# Patient Record
Sex: MALE | Race: BLACK OR AFRICAN AMERICAN | NOT HISPANIC OR LATINO | Employment: UNEMPLOYED | URBAN - METROPOLITAN AREA
[De-identification: names, ages, dates, MRNs, and addresses within clinical notes are randomized per-mention and may not be internally consistent; named-entity substitution may affect disease eponyms.]

---

## 2018-11-19 ENCOUNTER — HOSPITAL ENCOUNTER (EMERGENCY)
Facility: HOSPITAL | Age: 43
Discharge: HOME | End: 2018-11-19
Attending: EMERGENCY MEDICINE

## 2018-11-19 VITALS
WEIGHT: 206.56 LBS | TEMPERATURE: 99 F | HEART RATE: 88 BPM | BODY MASS INDEX: 29.57 KG/M2 | DIASTOLIC BLOOD PRESSURE: 69 MMHG | HEIGHT: 70 IN | SYSTOLIC BLOOD PRESSURE: 134 MMHG | OXYGEN SATURATION: 98 % | RESPIRATION RATE: 14 BRPM

## 2018-11-19 DIAGNOSIS — T78.40XA ALLERGIC REACTION, INITIAL ENCOUNTER: Primary | ICD-10-CM

## 2018-11-19 DIAGNOSIS — B35.4 TINEA CORPORIS: ICD-10-CM

## 2018-11-19 PROCEDURE — 99284 EMERGENCY DEPT VISIT MOD MDM: CPT

## 2018-11-19 PROCEDURE — 25000003 PHARM REV CODE 250: Performed by: PHYSICIAN ASSISTANT

## 2018-11-19 PROCEDURE — 63600175 PHARM REV CODE 636 W HCPCS: Performed by: PHYSICIAN ASSISTANT

## 2018-11-19 RX ORDER — CLOTRIMAZOLE 1 %
CREAM (GRAM) TOPICAL
Qty: 45 G | Refills: 0 | Status: SHIPPED | OUTPATIENT
Start: 2018-11-19 | End: 2024-03-31

## 2018-11-19 RX ORDER — PREDNISONE 20 MG/1
40 TABLET ORAL
Status: COMPLETED | OUTPATIENT
Start: 2018-11-19 | End: 2018-11-19

## 2018-11-19 RX ORDER — DIPHENHYDRAMINE HCL 50 MG
50 CAPSULE ORAL
Status: COMPLETED | OUTPATIENT
Start: 2018-11-19 | End: 2018-11-19

## 2018-11-19 RX ORDER — PREDNISONE 20 MG/1
40 TABLET ORAL DAILY
Qty: 10 TABLET | Refills: 0 | Status: SHIPPED | OUTPATIENT
Start: 2018-11-19 | End: 2018-11-24

## 2018-11-19 RX ADMIN — DIPHENHYDRAMINE HYDROCHLORIDE 50 MG: 50 CAPSULE ORAL at 12:11

## 2018-11-19 RX ADMIN — PREDNISONE 40 MG: 20 TABLET ORAL at 12:11

## 2018-11-19 NOTE — DISCHARGE INSTRUCTIONS
You can take zyrtec daily to help instead of benadryl.  Take steroids as prescribed  Use the topical cream for the small areas of fungal rash. Try and change your clothes if you are sweating and stay dry.  Follow up with your primary care provider.  For worsening symptoms, chest pain, shortness of breath, increased abdominal pain, high grade fever, stroke or stroke like symptoms, immediately go to the nearest Emergency Room or call 911 as soon as possible.

## 2018-11-19 NOTE — ED PROVIDER NOTES
Encounter Date: 11/19/2018    SCRIBE #1 NOTE: I, Jairo Gellerlucian, am scribing for, and in the presence of, Deanne Wong PA-C.       History     Chief Complaint   Patient presents with    Rash     rash L torso with itching x 2 days.     Time seen by provider: 12:32 PM on 11/19/2018      Alex Nobles is a 43 y.o. male with no medical history who presents to the ED for further evaluation of a rash that started 3 days ago. The patient states that he noticed the rash on the upper back 3 days ago, that started to spread to his back, abdomen, and scrotal region. Per patient, he relays that the rash is red and raised. He admits that he has some swelling to his lips and scrotum region too. The patient states that he is in a hot environment and sweats a decent amount. Per patient has had no new environmental or hygenic agents. He also denies SOB, trouble swallowing, chest pain, abdominal pain, headache, and fever. He states he has NKDA.      The history is provided by the patient.     Review of patient's allergies indicates:  No Known Allergies  History reviewed. No pertinent past medical history.  History reviewed. No pertinent surgical history.  History reviewed. No pertinent family history.  Social History     Tobacco Use    Smoking status: Never Smoker   Substance Use Topics    Alcohol use: Not on file    Drug use: Yes     Types: Marijuana     Review of Systems   Constitutional: Negative for activity change, appetite change, chills and fever.   HENT: Negative for congestion, rhinorrhea and sore throat.    Eyes: Negative for redness and visual disturbance.   Respiratory: Negative for cough, chest tightness and shortness of breath.    Cardiovascular: Negative for chest pain.   Gastrointestinal: Negative for abdominal pain, diarrhea, nausea and vomiting.   Genitourinary: Negative for dysuria and frequency.   Musculoskeletal: Negative for back pain, neck pain and neck stiffness.   Skin: Positive for rash.    Neurological: Negative for dizziness, syncope, numbness and headaches.       Physical Exam     Initial Vitals [11/19/18 1219]   BP Pulse Resp Temp SpO2   134/69 88 14 98.7 °F (37.1 °C) 98 %      MAP       --         Physical Exam    Nursing note and vitals reviewed.  Constitutional: He appears well-developed and well-nourished. He is cooperative.  Non-toxic appearance. He does not have a sickly appearance.   HENT:   Head: Normocephalic and atraumatic.   Right Ear: External ear normal.   Left Ear: External ear normal.   Nose: Nose normal.   Mouth/Throat: Oropharynx is clear and moist.   Eyes: Conjunctivae and lids are normal. Pupils are equal, round, and reactive to light.   Neck: Normal range of motion and full passive range of motion without pain. Neck supple. No stridor present.   Cardiovascular: Normal rate, regular rhythm and normal heart sounds. Exam reveals no gallop and no friction rub.    No murmur heard.  Pulmonary/Chest: Breath sounds normal. He has no wheezes. He has no rhonchi. He has no rales.   Abdominal: Normal appearance.   Neurological: He is alert and oriented to person, place, and time.   Skin: Skin is warm, dry and intact. Rash noted.   Erythematous macular papular rash noted to the back, scrotum, and stomach.   A few scaly patches noted to the back that are consistent with a fungal rash.         ED Course   Procedures  Labs Reviewed - No data to display       Imaging Results    None          Medical Decision Making:   History:   Old Medical Records: I decided to obtain old medical records.       APC / Resident Notes:   Urgent evaluation of a 43-year-old male who presents with rash for 2 days.  He reports associated pruritus.  He is well appearing.  Vital signs are stable. No oral swelling. No stridor. Rash consistent with urticaria. He also has an unassociated fungal rash likely secondary to staying damp/sweating at work. Will give steroids and benadryl for symptoms and topical cream for  fungal rash. Follow up with PCP and dermatology. Discussed results with patient. Return precautions given. Based on my clinical evaluation, I do not appreciate any immediate, emergent, or life threatening condition or etiology that warrants additional workup today and feel that the patient can be discharged with close follow up care.  Patient is to follow up with their primary care provider. Case was discussed with Dr. Bianchi who has evaluated the patient and is in agreement with the plan of care. All questions answered.          Scribe Attestation:   Scribe #1: I performed the above scribed service and the documentation accurately describes the services I performed. I attest to the accuracy of the note.    Attending Attestation:     Physician Attestation Statement for NP/PA:   I have conducted a face to face encounter with this patient in addition to the NP/PA, due to Medical Complexity    Other NP/PA Attestation Additions:      Medical Decision Making: I provided a face to face evaluation of this patient.  I discussed the patient's care with Advanced Practice Clinician.  I reviewed their note and agree with the history, physical, assessment, diagnosis, treatment, and discharge plan provided by the Advanced Practice Clinician. My overall impression is tinea corporis, urticaria.  The patient has been instructed to follow up with their physician or the one provided as well as specific return precautions.            I, Deanne Wong PA-C, personally performed the services described in this documentation. All medical record entries made by the scribe were at my direction and in my presence.  I have reviewed the chart and agree that the record reflects my personal performance and is accurate and complete. Deanne Wong PA-C.  4:16 PM 11/19/2018          ED Course as of Nov 19 1621   Mon Nov 19, 2018   1242 BP: 134/69 [EF]   1242 Temp: 98.7 °F (37.1 °C) [EF]   1242 Temp src: Oral [EF]   1242 Pulse: 88 [EF]    1242 Resp: 14 [EF]   1242 SpO2: 98 % [EF]   1254 Patient presents to the emergency room with diffuse scaly rash.  Multiple oval circular lesions, scaly.  Clinically consistent with a tinea infection.  Patient also appears to have secondary heights.  Patient has an urticarial rash that is located in other regions as well.  He has some swelling to the dorsum of the penis.  Patient will be given oral steroids and Benadryl in the ER.  He reports improvement earlier today with oral Benadryl.  Patient appears to have tinea with a concomitant urticarial reaction.  Patient will be given topical antifungal and oral steroids and referred to local primary care.  [EF]      ED Course User Index  [EF] David Bianchi MD     Clinical Impression:   The primary encounter diagnosis was Allergic reaction, initial encounter. A diagnosis of Tinea corporis was also pertinent to this visit.      Disposition:   Disposition: Discharged  Condition: Stable                        Deanne Wong PA-C  11/19/18 1625       David Bianchi MD  11/19/18 7450

## 2018-12-24 ENCOUNTER — HOSPITAL ENCOUNTER (EMERGENCY)
Facility: HOSPITAL | Age: 43
Discharge: HOME | End: 2018-12-24
Attending: EMERGENCY MEDICINE

## 2018-12-24 VITALS
HEART RATE: 64 BPM | TEMPERATURE: 98 F | OXYGEN SATURATION: 98 % | HEIGHT: 70 IN | SYSTOLIC BLOOD PRESSURE: 134 MMHG | BODY MASS INDEX: 31.35 KG/M2 | DIASTOLIC BLOOD PRESSURE: 60 MMHG | WEIGHT: 219 LBS | RESPIRATION RATE: 18 BRPM

## 2018-12-24 DIAGNOSIS — R22.0 SWELLING OF UPPER LIP: Primary | ICD-10-CM

## 2018-12-24 PROCEDURE — 63600175 PHARM REV CODE 636 W HCPCS: Performed by: EMERGENCY MEDICINE

## 2018-12-24 PROCEDURE — 25000003 PHARM REV CODE 250: Performed by: EMERGENCY MEDICINE

## 2018-12-24 PROCEDURE — 96372 THER/PROPH/DIAG INJ SC/IM: CPT | Mod: 59

## 2018-12-24 PROCEDURE — 99284 EMERGENCY DEPT VISIT MOD MDM: CPT | Mod: 25

## 2018-12-24 RX ORDER — METHYLPREDNISOLONE SOD SUCC 125 MG
125 VIAL (EA) INJECTION
Status: COMPLETED | OUTPATIENT
Start: 2018-12-24 | End: 2018-12-24

## 2018-12-24 RX ORDER — DIPHENHYDRAMINE HYDROCHLORIDE 50 MG/ML
25 INJECTION INTRAMUSCULAR; INTRAVENOUS
Status: COMPLETED | OUTPATIENT
Start: 2018-12-24 | End: 2018-12-24

## 2018-12-24 RX ORDER — FAMOTIDINE 20 MG/1
20 TABLET, FILM COATED ORAL
Status: COMPLETED | OUTPATIENT
Start: 2018-12-24 | End: 2018-12-24

## 2018-12-24 RX ADMIN — FAMOTIDINE 20 MG: 20 TABLET ORAL at 05:12

## 2018-12-24 RX ADMIN — DIPHENHYDRAMINE HYDROCHLORIDE 25 MG: 50 INJECTION, SOLUTION INTRAMUSCULAR; INTRAVENOUS at 05:12

## 2018-12-24 RX ADMIN — METHYLPREDNISOLONE SODIUM SUCCINATE 125 MG: 125 INJECTION, POWDER, FOR SOLUTION INTRAMUSCULAR; INTRAVENOUS at 05:12

## 2018-12-24 NOTE — ED TRIAGE NOTES
Patient here with allergic reaction to unknown substance; upper lip swollen and tingling and had hives a month ago

## 2018-12-24 NOTE — ED NOTES
Patient identifiers for Alex Nobles checked and correct.  LOC:  Patient is awake, alert, and aware of environment with an appropriate affect. Patient is oriented x 3 and speaking appropriately.  APPEARANCE:  Patient resting comfortably and in no acute distress. Patient is clean and well groomed, patient's clothing is properly fastened.  SKIN:  The skin is warm and dry. Patient has normal skin turgor and moist mucus membranes. Skin is intact; no bruising or breakdown noted.  MUSCULOSKELETAL:  Patient is moving all extremities well, no obvious deformities noted. Pulses intact.   RESPIRATORY:  Airway is open and patent. Respirations are spontaneous and non-labored with normal effort and rate.  CARDIAC:  Patient has a normal rate and rhythm. No peripheral edema noted.   ABDOMEN:  No distention noted.    NEUROLOGICAL:   Facial expression is symmetrical.    Allergies reported:  Review of patient's allergies indicates:  No Known Allergies  OTHER NOTES:  Patient here with swollen upper lip, started suddenly and thinks it's an allergic reaction to unknown substance; here a month ago with rash.

## 2018-12-25 NOTE — ED PROVIDER NOTES
Encounter Date: 12/24/2018       History     Chief Complaint   Patient presents with    Allergic Reaction     to unknown substance; lip swollen     Patient is a 43-year-old male presenting to the emergency department complains of isolated lower lip swelling that he noticed upon awakening this morning.  He denies taking Ace inhibitors or angiotensin receptor blockers.  He denies a previous history of anaphylaxis but states he has experienced 1 generalized allergic reaction 1 month ago, stimulated by an unknown agent.  He denies taking anything for symptoms prior to arrival.  He denies swelling in the mouth, wheezing or difficulty breathing.          Review of patient's allergies indicates:  No Known Allergies  History reviewed. No pertinent past medical history.  History reviewed. No pertinent surgical history.  History reviewed. No pertinent family history.  Social History     Tobacco Use    Smoking status: Never Smoker    Smokeless tobacco: Never Used   Substance Use Topics    Alcohol use: Not on file    Drug use: Yes     Types: Marijuana     Review of Systems   HENT: Positive for facial swelling.    Respiratory: Negative for wheezing.    Skin: Negative for rash.   Neurological: Negative for syncope.       Physical Exam     Initial Vitals [12/24/18 0515]   BP Pulse Resp Temp SpO2   134/60 64 18 98.3 °F (36.8 °C) 98 %      MAP       --         Physical Exam    Nursing note and vitals reviewed.  Constitutional: He appears well-nourished. No distress.   HENT:   Head: Normocephalic and atraumatic.   Isolated lower lip swelling without intraoral swelling, no facial swelling, no stridor   Eyes: Conjunctivae and EOM are normal.   Pulmonary/Chest: No respiratory distress. He has no wheezes.   Musculoskeletal: He exhibits no edema.   Skin: No rash noted.   Psychiatric: He has a normal mood and affect. Thought content normal.         ED Course   Procedures  Labs Reviewed - No data to display       Imaging Results     None          Medical Decision Making:   ED Management:  Patient assessed emergently.  No evidence of airway compromise.  Patient received improvement with intramuscular steroids and Benadryl.  Multiple etiologies were discussed but a particular stimulus cannot be identified.  The patient is strongly encouraged to follow up with an allergist/immunologist as soon as possible regarding further testing.  Current no indication for additional steroid burst or taper but the patient is asked to return to the ER for any new, concerning, or worsening symptoms, including any worsening swelling, wheezing or difficulty breathing.  Patient is agreeable with this plan for follow-up and he was discharged in stable condition.                      Clinical Impression:   The encounter diagnosis was Swelling of upper lip.      Disposition:   Disposition: Discharged  Condition: Stable                        José Miguel Almonte MD  12/25/18 0578

## 2019-03-25 ENCOUNTER — HOSPITAL ENCOUNTER (EMERGENCY)
Facility: HOSPITAL | Age: 44
Discharge: HOME | End: 2019-03-25
Attending: EMERGENCY MEDICINE
Payer: COMMERCIAL

## 2019-03-25 VITALS
DIASTOLIC BLOOD PRESSURE: 51 MMHG | WEIGHT: 202 LBS | HEART RATE: 67 BPM | BODY MASS INDEX: 29.92 KG/M2 | TEMPERATURE: 98 F | RESPIRATION RATE: 20 BRPM | OXYGEN SATURATION: 97 % | HEIGHT: 69 IN | SYSTOLIC BLOOD PRESSURE: 113 MMHG

## 2019-03-25 DIAGNOSIS — S39.012A STRAIN OF LUMBAR REGION, INITIAL ENCOUNTER: Primary | ICD-10-CM

## 2019-03-25 PROCEDURE — 99284 EMERGENCY DEPT VISIT MOD MDM: CPT | Mod: 25

## 2019-03-25 PROCEDURE — 63600175 PHARM REV CODE 636 W HCPCS: Performed by: PHYSICIAN ASSISTANT

## 2019-03-25 PROCEDURE — 96372 THER/PROPH/DIAG INJ SC/IM: CPT

## 2019-03-25 RX ORDER — NAPROXEN 500 MG/1
500 TABLET ORAL 2 TIMES DAILY WITH MEALS
Qty: 14 TABLET | Refills: 0 | Status: SHIPPED | OUTPATIENT
Start: 2019-03-25 | End: 2019-04-01

## 2019-03-25 RX ORDER — ORPHENADRINE CITRATE 30 MG/ML
60 INJECTION INTRAMUSCULAR; INTRAVENOUS
Status: COMPLETED | OUTPATIENT
Start: 2019-03-25 | End: 2019-03-25

## 2019-03-25 RX ORDER — KETOROLAC TROMETHAMINE 30 MG/ML
30 INJECTION, SOLUTION INTRAMUSCULAR; INTRAVENOUS
Status: COMPLETED | OUTPATIENT
Start: 2019-03-25 | End: 2019-03-25

## 2019-03-25 RX ORDER — CYCLOBENZAPRINE HCL 10 MG
10 TABLET ORAL 3 TIMES DAILY PRN
Qty: 21 TABLET | Refills: 0 | Status: SHIPPED | OUTPATIENT
Start: 2019-03-25 | End: 2019-04-01

## 2019-03-25 RX ADMIN — ORPHENADRINE CITRATE 60 MG: 60 INJECTION INTRAMUSCULAR; INTRAVENOUS at 12:03

## 2019-03-25 RX ADMIN — KETOROLAC TROMETHAMINE 30 MG: 30 INJECTION, SOLUTION INTRAMUSCULAR at 12:03
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
## 2024-02-14 ENCOUNTER — HOSPITAL ENCOUNTER (EMERGENCY)
Facility: HOSPITAL | Age: 49
Discharge: HOME | End: 2024-02-14
Attending: EMERGENCY MEDICINE

## 2024-02-14 VITALS
DIASTOLIC BLOOD PRESSURE: 97 MMHG | RESPIRATION RATE: 18 BRPM | HEART RATE: 99 BPM | OXYGEN SATURATION: 95 % | BODY MASS INDEX: 28.63 KG/M2 | TEMPERATURE: 99 F | HEIGHT: 70 IN | SYSTOLIC BLOOD PRESSURE: 170 MMHG | WEIGHT: 200 LBS

## 2024-02-14 DIAGNOSIS — S51.811A LACERATION OF RIGHT FOREARM, INITIAL ENCOUNTER: Primary | ICD-10-CM

## 2024-02-14 PROCEDURE — 99284 EMERGENCY DEPT VISIT MOD MDM: CPT | Mod: 25

## 2024-02-14 PROCEDURE — 90471 IMMUNIZATION ADMIN: CPT | Performed by: STUDENT IN AN ORGANIZED HEALTH CARE EDUCATION/TRAINING PROGRAM

## 2024-02-14 PROCEDURE — 90715 TDAP VACCINE 7 YRS/> IM: CPT | Performed by: STUDENT IN AN ORGANIZED HEALTH CARE EDUCATION/TRAINING PROGRAM

## 2024-02-14 PROCEDURE — 63600175 PHARM REV CODE 636 W HCPCS: Performed by: STUDENT IN AN ORGANIZED HEALTH CARE EDUCATION/TRAINING PROGRAM

## 2024-02-14 PROCEDURE — 12004 RPR S/N/AX/GEN/TRK7.6-12.5CM: CPT

## 2024-02-14 PROCEDURE — 25000003 PHARM REV CODE 250: Performed by: STUDENT IN AN ORGANIZED HEALTH CARE EDUCATION/TRAINING PROGRAM

## 2024-02-14 RX ORDER — LIDOCAINE HYDROCHLORIDE 10 MG/ML
10 INJECTION, SOLUTION EPIDURAL; INFILTRATION; INTRACAUDAL; PERINEURAL
Status: COMPLETED | OUTPATIENT
Start: 2024-02-14 | End: 2024-02-14

## 2024-02-14 RX ORDER — CEPHALEXIN 500 MG/1
500 CAPSULE ORAL 4 TIMES DAILY
Qty: 20 CAPSULE | Refills: 0 | Status: SHIPPED | OUTPATIENT
Start: 2024-02-14 | End: 2024-02-19

## 2024-02-14 RX ADMIN — CLOSTRIDIUM TETANI TOXOID ANTIGEN (FORMALDEHYDE INACTIVATED), CORYNEBACTERIUM DIPHTHERIAE TOXOID ANTIGEN (FORMALDEHYDE INACTIVATED), BORDETELLA PERTUSSIS TOXOID ANTIGEN (GLUTARALDEHYDE INACTIVATED), BORDETELLA PERTUSSIS FILAMENTOUS HEMAGGLUTININ ANTIGEN (FORMALDEHYDE INACTIVATED), BORDETELLA PERTUSSIS PERTACTIN ANTIGEN, AND BORDETELLA PERTUSSIS FIMBRIAE 2/3 ANTIGEN 0.5 ML: 5; 2; 2.5; 5; 3; 5 INJECTION, SUSPENSION INTRAMUSCULAR at 04:02

## 2024-02-14 RX ADMIN — LIDOCAINE HYDROCHLORIDE 100 MG: 10 SOLUTION INTRAVENOUS at 04:02
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
## 2024-02-14 NOTE — DISCHARGE INSTRUCTIONS
You were evaluated and treated in the emergency department today. You were found to have a diagnoses that can be managed well at home, however that requires your commitment to getting better.   Problem-Specific Instructions:  Keep wound clean and dry.  Wash 3 times daily with gentle soap and water.  Do not submerge in standing water until sutures have been removed.  Return to the emergency room for suture removal in 7-10 days.      Ensure you follow up with your Primary Care Provider or any additional providers listed on this discharge sheet. While you may be healthy enough to go home today, I cannot predict the exact course of your diagnoses. As such, it is your responsibility to monitor symptoms, follow-up with another healthcare provider, or return to the emergency room for new or worsening concerns. Unless otherwise instructed, continue all home medications and any new medications prescribed to you in the Emergency Department.   General Maintenance: Ensure adequate hydration to prevent prolonged illness and recovery. Monitor your caloric intake with a goal of obtaining and maintaining a healthy weight to help prevent the development of chronic and life-threatening medical conditions. Start healthy fitness habits and aim for a goal of 30 minutes to an hour of exercise 3-5 times a week. Avoid the use of tobacco, alcohol, and illicit drugs as these may be detrimental to your health goals.

## 2024-02-24 ENCOUNTER — HOSPITAL ENCOUNTER (EMERGENCY)
Facility: HOSPITAL | Age: 49
Discharge: HOME | End: 2024-02-24
Attending: EMERGENCY MEDICINE

## 2024-02-24 VITALS
RESPIRATION RATE: 20 BRPM | OXYGEN SATURATION: 98 % | TEMPERATURE: 98 F | DIASTOLIC BLOOD PRESSURE: 90 MMHG | SYSTOLIC BLOOD PRESSURE: 150 MMHG | WEIGHT: 205 LBS | HEART RATE: 62 BPM | BODY MASS INDEX: 29.35 KG/M2 | HEIGHT: 70 IN

## 2024-02-24 DIAGNOSIS — L08.9 WOUND INFECTION: Primary | ICD-10-CM

## 2024-02-24 DIAGNOSIS — T14.8XXA WOUND INFECTION: Primary | ICD-10-CM

## 2024-02-24 PROCEDURE — 99282 EMERGENCY DEPT VISIT SF MDM: CPT

## 2024-02-24 PROCEDURE — 87070 CULTURE OTHR SPECIMN AEROBIC: CPT | Performed by: EMERGENCY MEDICINE

## 2024-02-24 RX ORDER — DOXYCYCLINE 100 MG/1
100 CAPSULE ORAL 2 TIMES DAILY
Qty: 20 CAPSULE | Refills: 0 | Status: SHIPPED | OUTPATIENT
Start: 2024-02-24 | End: 2024-03-05

## 2024-02-24 NOTE — ED PROVIDER NOTES
Encounter Date: 2/24/2024       History     Chief Complaint   Patient presents with    Wound Infection     Pt had stitches on the 14th, and feels like it may be infected. Looks red and swollen possible abscess.      49-year-old male presents to the emergency department status post laceration repair to the right forearm 10 days ago.  Patient reports that he was grilling when he accidentally lacerated his right forearm he was seen here and evaluated had a negative x-ray and sutures were placed he was discharged home with Keflex.  Patient states he is here to have his sutures removed he also reports swelling lateral to the incision with redness denies any associated fevers or foreign body sensation.       Review of patient's allergies indicates:  No Known Allergies  No past medical history on file.  No past surgical history on file.  No family history on file.  Social History     Tobacco Use    Smoking status: Never    Smokeless tobacco: Never   Substance Use Topics    Drug use: Yes     Types: Marijuana     Review of Systems   Constitutional:  Negative for fever.   HENT: Negative.     Respiratory: Negative.     Cardiovascular: Negative.    Gastrointestinal: Negative.    Genitourinary: Negative.    Skin:  Positive for wound.        Laceration right forearm    Neurological: Negative.  Negative for weakness, light-headedness and numbness.   All other systems reviewed and are negative.      Physical Exam     Initial Vitals [02/24/24 1008]   BP Pulse Resp Temp SpO2   (!) 152/93 (!) 59 18 97.7 °F (36.5 °C) 97 %      MAP       --         Physical Exam    Vitals reviewed.  Constitutional: He appears well-developed and well-nourished.   HENT:   Head: Normocephalic and atraumatic.   Eyes: Conjunctivae and EOM are normal. Pupils are equal, round, and reactive to light.   Pulmonary/Chest: Breath sounds normal.     Neurological: He is alert and oriented to person, place, and time. He has normal strength.   Skin: Skin is warm. No  rash noted.   Patient has a healed incision with 12 sutures intact there is some mild erythema and swelling with fluctuance to the lateral aspect of the wound patient denies any distal numbness or paresthesias he is able to flex and extend his wrist with passive resistance pulses are intact   Psychiatric: He has a normal mood and affect.         ED Course   Suture Removal    Date/Time: 2/24/2024 11:13 AM  Location procedure was performed: [facility] EMERGENCY DEPARTMENT    Performed by: Rosanne Ashley FNP  Authorized by: Roel Oneal Jr., MD  Body area: upper extremity  Wound Appearance: erythematous, tender, no drainage and clean  Sutures Removed: 12  Post-removal: Steri-Strips applied  Comments: 21 gauge needle used to aspirate fluctuant area next to suture line no purulent drainage noted just a small scant of blood.  There was no swelling noted after needle removed        Labs Reviewed   CULTURE, AEROBIC  (SPECIFY SOURCE)          Imaging Results    None          Medications - No data to display  Medical Decision Making  49-year-old male presents to the emergency department status post laceration repair to the right forearm 10 days ago.  Patient reports that he was grilling when he accidentally lacerated his right forearm he was seen here and evaluated had a negative x-ray and sutures were placed he was discharged home with Keflex.  Patient states he is here to have his sutures removed he also reports swelling lateral to the incision with redness denies any associated fevers or foreign body sensation.     Considerations include suture removal, infected wound, infected seroma, abscess, cellulitis    49-year-old male presents emergency department status post laceration repair 10 days ago after accidentally lacerating right forearm with a knife while growing.  Patient is here to have sutures removed the incision site is clean with no erythema or drainage.  However lateral to the incision there is erythema, and  swelling.  Sutures were removed without any wound dehiscence or drainage noted a sterile needle was placed in the area of fluctuance a small amount of blood drained no purulent drainage noted the blood was placed on a culture swab and sent to the lab for wound culturing.  Wound is suspicious for infected seroma therefore patient will be placed on doxycycline.  Patient denies any distal numbness or tingling, paresthesias there is no decreased range of motion with passive resistance to the wrist patient will be referred to hand surgery given return precautions    Risk  Prescription drug management.                                      Clinical Impression:  Final diagnoses:  [T14.8XXA, L08.9] Wound infection (Primary)          ED Disposition Condition    Discharge Stable          ED Prescriptions       Medication Sig Dispense Start Date End Date Auth. Provider    doxycycline (VIBRAMYCIN) 100 MG Cap Take 1 capsule (100 mg total) by mouth 2 (two) times daily. for 10 days 20 capsule 2/24/2024 3/5/2024 Rosanne Ashley FNP          Follow-up Information       Follow up With Specialties Details Why Contact Info    Kenyon Mesa MD Hand Surgery Schedule an appointment as soon as possible for a visit in 1 week  [address]  [address]  [place] [facility] [address]  [phone]               Rosanne Ashley FNP  02/24/24 5849

## 2024-02-24 NOTE — DISCHARGE INSTRUCTIONS
Follow-up as directed   Doxycycline as directed until all gone  Return for any concerns of condition becomes worse

## 2024-02-27 LAB — BACTERIA SPEC AEROBE CULT: NO GROWTH

## 2024-03-31 ENCOUNTER — HOSPITAL ENCOUNTER (OUTPATIENT)
Facility: HOSPITAL | Age: 49
Discharge: HOME | End: 2024-04-02
Attending: EMERGENCY MEDICINE | Admitting: INTERNAL MEDICINE
Payer: COMMERCIAL

## 2024-03-31 DIAGNOSIS — I31.9 PERICARDITIS: ICD-10-CM

## 2024-03-31 DIAGNOSIS — I31.9 PERICARDITIS, UNSPECIFIED CHRONICITY, UNSPECIFIED TYPE: Primary | ICD-10-CM

## 2024-03-31 DIAGNOSIS — R07.9 CHEST PAIN: ICD-10-CM

## 2024-03-31 LAB
ALBUMIN SERPL BCP-MCNC: 4.7 G/DL (ref 3.5–5.2)
ALP SERPL-CCNC: 87 U/L (ref 55–135)
ALT SERPL W/O P-5'-P-CCNC: 36 U/L (ref 10–44)
ANION GAP SERPL CALC-SCNC: 8 MMOL/L (ref 8–16)
AST SERPL-CCNC: 23 U/L (ref 10–40)
BASOPHILS # BLD AUTO: 0.02 K/UL (ref 0–0.2)
BASOPHILS NFR BLD: 0.3 % (ref 0–1.9)
BILIRUB SERPL-MCNC: 0.3 MG/DL (ref 0.1–1)
BNP SERPL-MCNC: 9 PG/ML (ref 0–99)
BUN SERPL-MCNC: 13 MG/DL (ref 6–20)
CALCIUM SERPL-MCNC: 9.3 MG/DL (ref 8.7–10.5)
CHLORIDE SERPL-SCNC: 102 MMOL/L (ref 95–110)
CO2 SERPL-SCNC: 24 MMOL/L (ref 23–29)
CREAT SERPL-MCNC: 1.1 MG/DL (ref 0.5–1.4)
DIFFERENTIAL METHOD BLD: ABNORMAL
EOSINOPHIL # BLD AUTO: 0 K/UL (ref 0–0.5)
EOSINOPHIL NFR BLD: 0.5 % (ref 0–8)
ERYTHROCYTE [DISTWIDTH] IN BLOOD BY AUTOMATED COUNT: 13.4 % (ref 11.5–14.5)
ERYTHROCYTE [SEDIMENTATION RATE] IN BLOOD BY WESTERGREN METHOD: 22 MM/HR (ref 0–10)
EST. GFR  (NO RACE VARIABLE): >60 ML/MIN/1.73 M^2
GLUCOSE SERPL-MCNC: 85 MG/DL (ref 70–110)
HCT VFR BLD AUTO: 39.7 % (ref 40–54)
HGB BLD-MCNC: 13.3 G/DL (ref 14–18)
IMM GRANULOCYTES # BLD AUTO: 0.01 K/UL (ref 0–0.04)
IMM GRANULOCYTES NFR BLD AUTO: 0.2 % (ref 0–0.5)
LYMPHOCYTES # BLD AUTO: 1.5 K/UL (ref 1–4.8)
LYMPHOCYTES NFR BLD: 26 % (ref 18–48)
MAGNESIUM SERPL-MCNC: 2.1 MG/DL (ref 1.6–2.6)
MCH RBC QN AUTO: 31.4 PG (ref 27–31)
MCHC RBC AUTO-ENTMCNC: 33.5 G/DL (ref 32–36)
MCV RBC AUTO: 94 FL (ref 82–98)
MONOCYTES # BLD AUTO: 0.6 K/UL (ref 0.3–1)
MONOCYTES NFR BLD: 9.8 % (ref 4–15)
NEUTROPHILS # BLD AUTO: 3.7 K/UL (ref 1.8–7.7)
NEUTROPHILS NFR BLD: 63.2 % (ref 38–73)
NRBC BLD-RTO: 0 /100 WBC
PLATELET # BLD AUTO: 194 K/UL (ref 150–450)
PMV BLD AUTO: 9.7 FL (ref 9.2–12.9)
POTASSIUM SERPL-SCNC: 3.8 MMOL/L (ref 3.5–5.1)
PROT SERPL-MCNC: 8 G/DL (ref 6–8.4)
RBC # BLD AUTO: 4.24 M/UL (ref 4.6–6.2)
SARS-COV-2 RDRP RESP QL NAA+PROBE: NEGATIVE
SODIUM SERPL-SCNC: 134 MMOL/L (ref 136–145)
TROPONIN I SERPL HS-MCNC: 7.6 PG/ML (ref 0–14.9)
TROPONIN I SERPL HS-MCNC: 9.3 PG/ML (ref 0–14.9)
WBC # BLD AUTO: 5.92 K/UL (ref 3.9–12.7)

## 2024-03-31 PROCEDURE — 99285 EMERGENCY DEPT VISIT HI MDM: CPT | Mod: 25

## 2024-03-31 PROCEDURE — 84484 ASSAY OF TROPONIN QUANT: CPT | Performed by: EMERGENCY MEDICINE

## 2024-03-31 PROCEDURE — 85651 RBC SED RATE NONAUTOMATED: CPT | Performed by: EMERGENCY MEDICINE

## 2024-03-31 PROCEDURE — 36415 COLL VENOUS BLD VENIPUNCTURE: CPT | Performed by: NURSE PRACTITIONER

## 2024-03-31 PROCEDURE — 96374 THER/PROPH/DIAG INJ IV PUSH: CPT

## 2024-03-31 PROCEDURE — 25000003 PHARM REV CODE 250: Performed by: EMERGENCY MEDICINE

## 2024-03-31 PROCEDURE — 96375 TX/PRO/DX INJ NEW DRUG ADDON: CPT

## 2024-03-31 PROCEDURE — 80053 COMPREHEN METABOLIC PANEL: CPT | Performed by: EMERGENCY MEDICINE

## 2024-03-31 PROCEDURE — G0378 HOSPITAL OBSERVATION PER HR: HCPCS

## 2024-03-31 PROCEDURE — 25000003 PHARM REV CODE 250: Performed by: NURSE PRACTITIONER

## 2024-03-31 PROCEDURE — 93010 ELECTROCARDIOGRAM REPORT: CPT | Mod: ,,, | Performed by: GENERAL PRACTICE

## 2024-03-31 PROCEDURE — 93005 ELECTROCARDIOGRAM TRACING: CPT | Performed by: GENERAL PRACTICE

## 2024-03-31 PROCEDURE — 83880 ASSAY OF NATRIURETIC PEPTIDE: CPT | Performed by: EMERGENCY MEDICINE

## 2024-03-31 PROCEDURE — 84484 ASSAY OF TROPONIN QUANT: CPT | Mod: 91 | Performed by: NURSE PRACTITIONER

## 2024-03-31 PROCEDURE — 83735 ASSAY OF MAGNESIUM: CPT | Performed by: EMERGENCY MEDICINE

## 2024-03-31 PROCEDURE — 63600175 PHARM REV CODE 636 W HCPCS: Performed by: EMERGENCY MEDICINE

## 2024-03-31 PROCEDURE — U0002 COVID-19 LAB TEST NON-CDC: HCPCS | Performed by: EMERGENCY MEDICINE

## 2024-03-31 PROCEDURE — 85025 COMPLETE CBC W/AUTO DIFF WBC: CPT | Performed by: EMERGENCY MEDICINE

## 2024-03-31 RX ORDER — ONDANSETRON HYDROCHLORIDE 2 MG/ML
4 INJECTION, SOLUTION INTRAVENOUS EVERY 6 HOURS PRN
Status: DISCONTINUED | OUTPATIENT
Start: 2024-03-31 | End: 2024-04-02 | Stop reason: HOSPADM

## 2024-03-31 RX ORDER — DEXAMETHASONE SODIUM PHOSPHATE 4 MG/ML
8 INJECTION, SOLUTION INTRA-ARTICULAR; INTRALESIONAL; INTRAMUSCULAR; INTRAVENOUS; SOFT TISSUE
Status: COMPLETED | OUTPATIENT
Start: 2024-03-31 | End: 2024-03-31

## 2024-03-31 RX ORDER — COLCHICINE 0.6 MG/1
1.2 TABLET, FILM COATED ORAL ONCE
Status: COMPLETED | OUTPATIENT
Start: 2024-03-31 | End: 2024-03-31

## 2024-03-31 RX ORDER — COLCHICINE 0.6 MG/1
0.6 TABLET, FILM COATED ORAL DAILY
Status: DISCONTINUED | OUTPATIENT
Start: 2024-04-01 | End: 2024-03-31

## 2024-03-31 RX ORDER — HYDROCODONE BITARTRATE AND ACETAMINOPHEN 5; 325 MG/1; MG/1
1 TABLET ORAL EVERY 4 HOURS PRN
Status: DISCONTINUED | OUTPATIENT
Start: 2024-03-31 | End: 2024-04-02 | Stop reason: HOSPADM

## 2024-03-31 RX ORDER — LORAZEPAM 2 MG/ML
1 INJECTION INTRAMUSCULAR
Status: COMPLETED | OUTPATIENT
Start: 2024-03-31 | End: 2024-03-31

## 2024-03-31 RX ORDER — METOPROLOL TARTRATE 1 MG/ML
5 INJECTION, SOLUTION INTRAVENOUS
Status: COMPLETED | OUTPATIENT
Start: 2024-03-31 | End: 2024-03-31

## 2024-03-31 RX ORDER — SODIUM CHLORIDE 0.9 % (FLUSH) 0.9 %
10 SYRINGE (ML) INJECTION
Status: DISCONTINUED | OUTPATIENT
Start: 2024-03-31 | End: 2024-04-02 | Stop reason: HOSPADM

## 2024-03-31 RX ORDER — ACETAMINOPHEN 325 MG/1
650 TABLET ORAL EVERY 4 HOURS PRN
Status: DISCONTINUED | OUTPATIENT
Start: 2024-03-31 | End: 2024-04-02 | Stop reason: HOSPADM

## 2024-03-31 RX ORDER — COLCHICINE 0.6 MG/1
0.6 TABLET, FILM COATED ORAL 2 TIMES DAILY
Status: DISCONTINUED | OUTPATIENT
Start: 2024-03-31 | End: 2024-04-02 | Stop reason: HOSPADM

## 2024-03-31 RX ORDER — AMOXICILLIN 250 MG
1 CAPSULE ORAL 2 TIMES DAILY
Status: DISCONTINUED | OUTPATIENT
Start: 2024-03-31 | End: 2024-04-02 | Stop reason: HOSPADM

## 2024-03-31 RX ORDER — PANTOPRAZOLE SODIUM 40 MG/1
40 TABLET, DELAYED RELEASE ORAL 2 TIMES DAILY
Status: DISCONTINUED | OUTPATIENT
Start: 2024-03-31 | End: 2024-04-02 | Stop reason: HOSPADM

## 2024-03-31 RX ORDER — ASPIRIN 325 MG
325 TABLET ORAL
Status: COMPLETED | OUTPATIENT
Start: 2024-03-31 | End: 2024-03-31

## 2024-03-31 RX ADMIN — IBUPROFEN 600 MG: 400 TABLET ORAL at 11:03

## 2024-03-31 RX ADMIN — METOPROLOL TARTRATE 5 MG: 1 INJECTION, SOLUTION INTRAVENOUS at 04:03

## 2024-03-31 RX ADMIN — NITROGLYCERIN 1 INCH: 20 OINTMENT TOPICAL at 03:03

## 2024-03-31 RX ADMIN — DEXAMETHASONE SODIUM PHOSPHATE 8 MG: 4 INJECTION INTRA-ARTICULAR; INTRALESIONAL; INTRAMUSCULAR; INTRAVENOUS; SOFT TISSUE at 06:03

## 2024-03-31 RX ADMIN — COLCHICINE 0.6 MG: 0.6 TABLET, FILM COATED ORAL at 11:03

## 2024-03-31 RX ADMIN — COLCHICINE 1.2 MG: 0.6 TABLET, FILM COATED ORAL at 08:03

## 2024-03-31 RX ADMIN — SENNOSIDES AND DOCUSATE SODIUM 1 TABLET: 8.6; 5 TABLET ORAL at 11:03

## 2024-03-31 RX ADMIN — ASPIRIN 325 MG ORAL TABLET 325 MG: 325 PILL ORAL at 04:03

## 2024-03-31 RX ADMIN — PANTOPRAZOLE SODIUM 40 MG: 40 TABLET, DELAYED RELEASE ORAL at 06:03

## 2024-03-31 RX ADMIN — LORAZEPAM 1 MG: 2 INJECTION INTRAMUSCULAR; INTRAVENOUS at 04:03

## 2024-03-31 NOTE — HPI
49 year old male with a no significant past medical history reports to the emergency room today for complaints of substernal chest pain, tightness and shortness of breath worsening with exertion, and high blood pressure. Denies fever, chills, nausea, vomiting. Chest trauma. Pain feels better when he sits up and leans forward. In ER, EKG with evidence of pericarditis. Admitted to hospital medicine due to patient clinical presentation concerning for acute coronary syndrome. Aspirin given. Steroid and nitroglycerin given and patient initially hypertensive, treated with beta-blocker.

## 2024-03-31 NOTE — H&P
[facility] Emergency Dept  Hospital Medicine  History & Physical    Patient Name: Alex Nobles  MRN: [id number]  Patient Class: OP- Observation  Admission Date: 3/31/2024  Attending Physician: Prashanth Brandon MD   Primary Care Provider: Fabby, Primary Doctor         Patient information was obtained from patient and ER records.     Subjective:     Principal Problem:<principal problem not specified>    Chief Complaint:   Chief Complaint   Patient presents with    Chest Pain     ONSET TODAY    Cough        HPI: 49 year old male with a no significant past medical history reports to the emergency room today for complaints of substernal chest pain, tightness and shortness of breath worsening with exertion, and high blood pressure. Denies fever, chills, nausea, vomiting. Chest trauma. Pain feels better when he sits up and leans forward. In ER, EKG with evidence of pericarditis. Admitted to hospital medicine due to patient clinical presentation concerning for acute coronary syndrome. Aspirin given. Steroid and nitroglycerin given and patient initially hypertensive, treated with beta-blocker.     No past medical history on file.    No past surgical history on file.    Review of patient's allergies indicates:  No Known Allergies    No current facility-administered medications on file prior to encounter.     Current Outpatient Medications on File Prior to Encounter   Medication Sig    [DISCONTINUED] clotrimazole (LOTRIMIN) 1 % cream Apply to affected area 2 times daily for 14 days     Family History    None       Tobacco Use    Smoking status: Never    Smokeless tobacco: Never   Substance and Sexual Activity    Alcohol use: Not on file    Drug use: Yes     Types: Marijuana    Sexual activity: Not on file     Review of Systems   Constitutional:  Positive for fatigue.   Respiratory:  Positive for cough and shortness of breath.    Cardiovascular:  Positive for chest pain. Negative for palpitations and leg  swelling.   Neurological:  Negative for weakness and headaches.   All other systems reviewed and are negative.    Objective:     Vital Signs (Most Recent):  Temp: 98.4 °F (36.9 °C) (03/31/24 1539)  Pulse: 88 (03/31/24 1539)  Resp: 18 (03/31/24 1539)  BP: (!) 152/78 (03/31/24 1625)  SpO2: 99 % (03/31/24 1539) Vital Signs (24h Range):  Temp:  [98.4 °F (36.9 °C)] 98.4 °F (36.9 °C)  Pulse:  [88] 88  Resp:  [18] 18  SpO2:  [99 %] 99 %  BP: (152-189)/() 152/78     Weight: 93 kg (205 lb)  Body mass index is 29.41 kg/m².     Physical Exam  Vitals reviewed.   Constitutional:       Appearance: Normal appearance.   HENT:      Head: Normocephalic and atraumatic.      Mouth/Throat:      Mouth: Mucous membranes are moist.   Eyes:      Extraocular Movements: Extraocular movements intact.      Pupils: Pupils are equal, round, and reactive to light.   Cardiovascular:      Rate and Rhythm: Normal rate and regular rhythm.      Pulses: Normal pulses.      Heart sounds: No murmur heard.     No friction rub.   Pulmonary:      Effort: Pulmonary effort is normal. No respiratory distress.      Breath sounds: Normal breath sounds. No wheezing.   Chest:      Chest wall: Tenderness present.   Abdominal:      General: Bowel sounds are normal.      Palpations: Abdomen is soft.      Tenderness: There is no abdominal tenderness. There is no guarding.   Musculoskeletal:         General: No swelling or tenderness. Normal range of motion.      Cervical back: Normal range of motion and neck supple.   Skin:     General: Skin is warm and dry.      Capillary Refill: Capillary refill takes less than 2 seconds.   Neurological:      General: No focal deficit present.      Mental Status: He is alert.   Psychiatric:         Mood and Affect: Mood normal.              CRANIAL NERVES     CN III, IV, VI   Pupils are equal, round, and reactive to light.       Significant Labs: All pertinent labs within the past 24 hours have been reviewed.  Recent Lab  Results         03/31/24  1632   03/31/24  1619        Albumin   4.7       ALP   87       ALT   36       Anion Gap   8       AST   23       Baso #   0.02       Basophil %   0.3       BILIRUBIN TOTAL   0.3  Comment: For infants and newborns, interpretation of results should be based  on gestational age, weight and in agreement with clinical  observations.    Premature Infant recommended reference ranges:  Up to 24 hours.............<8.0 mg/dL  Up to 48 hours............<12.0 mg/dL  3-5 days..................<15.0 mg/dL  6-29 days.................<15.0 mg/dL         BNP   9  Comment: Values of less than 100 pg/ml are consistent with non-CHF populations.       BUN   13       Calcium   9.3       Chloride   102       CO2   24       Creatinine   1.1       Differential Method   Automated       eGFR   >60.0       Eos #   0.0       Eos %   0.5       Glucose   85       Gran # (ANC)   3.7       Gran %   63.2       Hematocrit   39.7       Hemoglobin   13.3       Immature Grans (Abs)   0.01  Comment: Mild elevation in immature granulocytes is non specific and   can be seen in a variety of conditions including stress response,   acute inflammation, trauma and pregnancy. Correlation with other   laboratory and clinical findings is essential.         Immature Granulocytes   0.2       Lymph #   1.5       Lymph %   26.0       Magnesium    2.1       MCH   31.4       MCHC   33.5       MCV   94       Mono #   0.6       Mono %   9.8       MPV   9.7       nRBC   0       Platelet Count   194       Potassium   3.8       PROTEIN TOTAL   8.0       RBC   4.24       RDW   13.4       SARS-CoV-2 RNA, Amplification, Qual Negative  Comment: This test utilizes isothermal nucleic acid amplification technology   to   detect the SARS-CoV-2 RdRp nucleic acid segment. The analytical   sensitivity   (limit of detection) is 500 copies/swab.     A POSITIVE result is indicative of the presence of SARS-CoV-2 RNA;   clinical   correlation with patient history  and other diagnostic information is   necessary to determine patient infection status.    A NEGATIVE result means that SARS-CoV-2 nucleic acids are not present   above   the limit of detection. A NEGATIVE result should be treated as   presumptive.   It does not rule out the possibility of COVID-19 and should not be   the sole   basis for treatment decisions. If COVID-19 is strongly suspected   based on   clinical and exposure history, re-testing using an alternate   molecular assay   should be considered.     This test is FDA approved.Performance characteristics of this test   has been   independently verified by [facility] in conjunction   with   [facility] Department of Pathology and Laboratory   Medicine.           Sodium   134       Troponin I High Sensitivity   7.6  Comment: Troponin results differ between methods. Do not use   results between Troponin methods interchangeably.    Alkaline Phospatase levels above 400 U/L may   cause false positive results.    Access hsTnI should not be used for patients taking   Asfotase ken (Strensiq).         WBC   5.92               Significant Imaging: I have reviewed all pertinent imaging results/findings within the past 24 hours.  Imaging Results              X-Ray Chest AP Portable (Final result)  Result time 03/31/24 16:40:01      Final result by Farida Gorman DO (03/31/24 16:40:01)                   Narrative:    AP chest radiograph: 3/31/2024 4:39 PM CDT    Indication: 49 years  old Male with Chest Pain.    Comparison: None available    Findings: The cardiomediastinal silhouette is normal in size.    No pneumothorax is seen.    No acute airspace opacities are seen.    No discrete pleural effusion is apparent.    Impression: No acute airspace opacities are seen.    Electronically signed by:  Farida Gorman DO  03/31/2024 04:40 PM CDT Workstation: [id number]                                   Assessment/Plan:     Pericarditis  EKG with  acute pericarditis  Dexamethasone 8mg IV x1, Aspirin 325 mg given in ER    Start NSAID and Colchicine. Echo ordered. Tele monitoring.  If symptoms worsen consult cardiology  Check CRP       Chest pain  acute, trend troponin, ASA given, telemetry, consider consult cardiology for further risk stratification with non-invasive stress test.   Echo pending.  Nitro paste x1 with some relief    EKG prn Chest Pain  Troponin x 3  CBC, CMP, Mg, Phos daily  Fasting Lipid panel in am  TSH level  CXRreviewed  Supplemental O2 prn via nasal cannula to keep O2 Sats >92%  Beta Blockade if needed to reduce myocardial oxygen demand  Nitroglycerin prn            VTE Risk Mitigation (From admission, onward)           Ordered     Place sequential compression device  Until discontinued         03/31/24 1757     IP VTE LOW RISK PATIENT  Once         03/31/24 1757                         On 03/31/2024, patient should be placed in hospital observation services under my care in collaboration with Dr. Brandon.           Shawna Wallis, NP  Department of Hospital Medicine  [facility] - Emergency Dept

## 2024-03-31 NOTE — ASSESSMENT & PLAN NOTE
acute, trend troponin, ASA given, telemetry, consider consult cardiology for further risk stratification with non-invasive stress test.   Echo pending.  Nitro paste x1 with some relief    EKG prn Chest Pain  Troponin x 3  CBC, CMP, Mg, Phos daily  Fasting Lipid panel in am  TSH level  CXRreviewed  Supplemental O2 prn via nasal cannula to keep O2 Sats >92%  Beta Blockade if needed to reduce myocardial oxygen demand  Nitroglycerin prn

## 2024-03-31 NOTE — SUBJECTIVE & OBJECTIVE
No past medical history on file.    No past surgical history on file.    Review of patient's allergies indicates:  No Known Allergies    No current facility-administered medications on file prior to encounter.     Current Outpatient Medications on File Prior to Encounter   Medication Sig    [DISCONTINUED] clotrimazole (LOTRIMIN) 1 % cream Apply to affected area 2 times daily for 14 days     Family History    None       Tobacco Use    Smoking status: Never    Smokeless tobacco: Never   Substance and Sexual Activity    Alcohol use: Not on file    Drug use: Yes     Types: Marijuana    Sexual activity: Not on file     Review of Systems   Constitutional:  Positive for fatigue.   Respiratory:  Positive for cough and shortness of breath.    Cardiovascular:  Positive for chest pain. Negative for palpitations and leg swelling.   Neurological:  Negative for weakness and headaches.   All other systems reviewed and are negative.    Objective:     Vital Signs (Most Recent):  Temp: 98.4 °F (36.9 °C) (03/31/24 1539)  Pulse: 88 (03/31/24 1539)  Resp: 18 (03/31/24 1539)  BP: (!) 152/78 (03/31/24 1625)  SpO2: 99 % (03/31/24 1539) Vital Signs (24h Range):  Temp:  [98.4 °F (36.9 °C)] 98.4 °F (36.9 °C)  Pulse:  [88] 88  Resp:  [18] 18  SpO2:  [99 %] 99 %  BP: (152-189)/() 152/78     Weight: 93 kg (205 lb)  Body mass index is 29.41 kg/m².     Physical Exam  Vitals reviewed.   Constitutional:       Appearance: Normal appearance.   HENT:      Head: Normocephalic and atraumatic.      Mouth/Throat:      Mouth: Mucous membranes are moist.   Eyes:      Extraocular Movements: Extraocular movements intact.      Pupils: Pupils are equal, round, and reactive to light.   Cardiovascular:      Rate and Rhythm: Normal rate and regular rhythm.      Pulses: Normal pulses.      Heart sounds: No murmur heard.     No friction rub.   Pulmonary:      Effort: Pulmonary effort is normal. No respiratory distress.      Breath sounds: Normal breath sounds.  No wheezing.   Chest:      Chest wall: Tenderness present.   Abdominal:      General: Bowel sounds are normal.      Palpations: Abdomen is soft.      Tenderness: There is no abdominal tenderness. There is no guarding.   Musculoskeletal:         General: No swelling or tenderness. Normal range of motion.      Cervical back: Normal range of motion and neck supple.   Skin:     General: Skin is warm and dry.      Capillary Refill: Capillary refill takes less than 2 seconds.   Neurological:      General: No focal deficit present.      Mental Status: He is alert.   Psychiatric:         Mood and Affect: Mood normal.              CRANIAL NERVES     CN III, IV, VI   Pupils are equal, round, and reactive to light.       Significant Labs: All pertinent labs within the past 24 hours have been reviewed.  Recent Lab Results         03/31/24  1632   03/31/24  1619        Albumin   4.7       ALP   87       ALT   36       Anion Gap   8       AST   23       Baso #   0.02       Basophil %   0.3       BILIRUBIN TOTAL   0.3  Comment: For infants and newborns, interpretation of results should be based  on gestational age, weight and in agreement with clinical  observations.    Premature Infant recommended reference ranges:  Up to 24 hours.............<8.0 mg/dL  Up to 48 hours............<12.0 mg/dL  3-5 days..................<15.0 mg/dL  6-29 days.................<15.0 mg/dL         BNP   9  Comment: Values of less than 100 pg/ml are consistent with non-CHF populations.       BUN   13       Calcium   9.3       Chloride   102       CO2   24       Creatinine   1.1       Differential Method   Automated       eGFR   >60.0       Eos #   0.0       Eos %   0.5       Glucose   85       Gran # (ANC)   3.7       Gran %   63.2       Hematocrit   39.7       Hemoglobin   13.3       Immature Grans (Abs)   0.01  Comment: Mild elevation in immature granulocytes is non specific and   can be seen in a variety of conditions including stress response,    acute inflammation, trauma and pregnancy. Correlation with other   laboratory and clinical findings is essential.         Immature Granulocytes   0.2       Lymph #   1.5       Lymph %   26.0       Magnesium    2.1       MCH   31.4       MCHC   33.5       MCV   94       Mono #   0.6       Mono %   9.8       MPV   9.7       nRBC   0       Platelet Count   194       Potassium   3.8       PROTEIN TOTAL   8.0       RBC   4.24       RDW   13.4       SARS-CoV-2 RNA, Amplification, Qual Negative  Comment: This test utilizes isothermal nucleic acid amplification technology   to   detect the SARS-CoV-2 RdRp nucleic acid segment. The analytical   sensitivity   (limit of detection) is 500 copies/swab.     A POSITIVE result is indicative of the presence of SARS-CoV-2 RNA;   clinical   correlation with patient history and other diagnostic information is   necessary to determine patient infection status.    A NEGATIVE result means that SARS-CoV-2 nucleic acids are not present   above   the limit of detection. A NEGATIVE result should be treated as   presumptive.   It does not rule out the possibility of COVID-19 and should not be   the sole   basis for treatment decisions. If COVID-19 is strongly suspected   based on   clinical and exposure history, re-testing using an alternate   molecular assay   should be considered.     This test is FDA approved.Performance characteristics of this test   has been   independently verified by [facility] in conjunction   with   [facility] Department of Pathology and Laboratory   Medicine.           Sodium   134       Troponin I High Sensitivity   7.6  Comment: Troponin results differ between methods. Do not use   results between Troponin methods interchangeably.    Alkaline Phospatase levels above 400 U/L may   cause false positive results.    Access hsTnI should not be used for patients taking   Asfotase ken (Strensiq).         WBC   5.92               Significant  Imaging: I have reviewed all pertinent imaging results/findings within the past 24 hours.  Imaging Results              X-Ray Chest AP Portable (Final result)  Result time 03/31/24 16:40:01      Final result by Farida Gorman DO (03/31/24 16:40:01)                   Narrative:    AP chest radiograph: 3/31/2024 4:39 PM CDT    Indication: 49 years  old Male with Chest Pain.    Comparison: None available    Findings: The cardiomediastinal silhouette is normal in size.    No pneumothorax is seen.    No acute airspace opacities are seen.    No discrete pleural effusion is apparent.    Impression: No acute airspace opacities are seen.    Electronically signed by:  Farida Gorman DO  03/31/2024 04:40 PM CDT Workstation: [id number]

## 2024-03-31 NOTE — ED PROVIDER NOTES
Encounter Date: 3/31/2024       History     Chief Complaint   Patient presents with    Chest Pain     ONSET TODAY    Cough     49-year-old male presented emergency department with substernal chest pain which feels like tightness and shortness of breath which started after exertion at home.  Patient checked his blood pressure was high so came here.  Patient states now he is gradually feeling better.  Denies fever chills or nausea vomiting.  Denies any trauma.  Patient states the pain is with exertion and also gets short of breath with exertion.  Patient states he is feeling better when he leans forward and with rest.      Review of patient's allergies indicates:  No Known Allergies  No past medical history on file.  No past surgical history on file.  No family history on file.  Social History     Tobacco Use    Smoking status: Never    Smokeless tobacco: Never   Substance Use Topics    Drug use: Yes     Types: Marijuana     Review of Systems   Constitutional: Negative.    HENT: Negative.     Eyes: Negative.    Respiratory:  Positive for shortness of breath.    Cardiovascular:  Positive for chest pain.   Gastrointestinal: Negative.    Endocrine: Negative.    Genitourinary: Negative.    Musculoskeletal: Negative.    Skin: Negative.    Allergic/Immunologic: Negative.    Neurological: Negative.    Hematological: Negative.    Psychiatric/Behavioral: Negative.     All other systems reviewed and are negative.      Physical Exam     Initial Vitals [03/31/24 1539]   BP Pulse Resp Temp SpO2   (!) 189/106 88 18 98.4 °F (36.9 °C) 99 %      MAP       --         Physical Exam    Nursing note and vitals reviewed.  Constitutional: He appears well-developed and well-nourished.   HENT:   Head: Normocephalic and atraumatic.   Nose: Nose normal.   Eyes: Conjunctivae and EOM are normal.   Neck: No tracheal deviation present.   Normal range of motion.  Cardiovascular:  Normal rate, regular rhythm, normal heart sounds and intact distal  pulses.     Exam reveals no friction rub.       No murmur heard.  Pulmonary/Chest: Breath sounds normal. No respiratory distress. He has no wheezes. He has no rales. He exhibits tenderness.   Abdominal: Abdomen is soft. He exhibits no distension. There is no abdominal tenderness.   Musculoskeletal:         General: Normal range of motion.      Cervical back: Normal range of motion.     Neurological: He is alert and oriented to person, place, and time. He has normal strength. No sensory deficit. GCS score is 15. GCS eye subscore is 4. GCS verbal subscore is 5. GCS motor subscore is 6.   Skin: Skin is warm and dry. Capillary refill takes less than 2 seconds.   Psychiatric: He has a normal mood and affect. Thought content normal.         ED Course   Procedures  Labs Reviewed   CBC W/ AUTO DIFFERENTIAL - Abnormal; Notable for the following components:       Result Value    RBC 4.24 (*)     Hemoglobin 13.3 (*)     Hematocrit 39.7 (*)     MCH 31.4 (*)     All other components within normal limits   COMPREHENSIVE METABOLIC PANEL - Abnormal; Notable for the following components:    Sodium 134 (*)     All other components within normal limits   MAGNESIUM   TROPONIN I HIGH SENSITIVITY   B-TYPE NATRIURETIC PEPTIDE   SARS-COV-2 RNA AMPLIFICATION, QUAL   TROPONIN I HIGH SENSITIVITY   SEDIMENTATION RATE     EKG Readings: (Independently Interpreted)   Initial Reading: No STEMI. Rhythm: Normal Sinus Rhythm. Ectopy: No Ectopy. Conduction: Normal.   HI depression noted diffusely   Other EKG Interpretations: Repeat EKG did not show any dynamic changes     ECG Results              EKG 12-lead (In process)        Collection Time Result Time QRS Duration OHS QTC Calculation    03/31/24 15:41:03 03/31/24 15:48:35 88 395                     In process by Interface, Lab In [facility] (03/31/24 15:48:41)                   Narrative:    Test Reason : R07.9,    Vent. Rate : 085 BPM     Atrial Rate : 085 BPM     P-R Int : 170 ms          QRS Dur :  088 ms      QT Int : 332 ms       P-R-T Axes : 071 075 056 degrees     QTc Int : 395 ms    Normal sinus rhythm  Acute pericarditis  Abnormal ECG  No previous ECGs available    Referred By: [email or web address]   SELF           Confirmed By:                                   Imaging Results              X-Ray Chest AP Portable (Final result)  Result time 03/31/24 16:40:01      Final result by Farida Gorman DO (03/31/24 16:40:01)                   Narrative:    AP chest radiograph: 3/31/2024 4:39 PM CDT    Indication: 49 years  old Male with Chest Pain.    Comparison: None available    Findings: The cardiomediastinal silhouette is normal in size.    No pneumothorax is seen.    No acute airspace opacities are seen.    No discrete pleural effusion is apparent.    Impression: No acute airspace opacities are seen.    Electronically signed by:  Farida Gorman DO  03/31/2024 04:40 PM CDT Workstation: [id number]                                  X-Rays:   Independently Interpreted Readings:   Other Readings:  Chest x-ray unremarkable    Medications   dexAMETHasone injection 8 mg (has no administration in time range)   aspirin tablet 325 mg (325 mg Oral Given 3/31/24 1629)   metoprolol injection 5 mg (5 mg Intravenous Given 3/31/24 1625)   nitroGLYCERIN 2% TD oint ointment 1 inch (1 inch Topical (Top) Given 3/31/24 1559)   LORazepam injection 1 mg (1 mg Intravenous Given 3/31/24 1627)     Medical Decision Making  49-year-old male with substernal chest pain and tightness worse with exertion associated with shortness of breath and better with rest and better with leaning forward.  EKG did show evidence of pericarditis.  Given patient's presentation and history concerning for acute coronary syndrome.  Aspirin given.  Steroid and nitroglycerin given and patient initially hypotensive and treated with beta-blocker.  Patient did have significant improvement of symptoms and as feeling well Hospital Medicine consulted for evaluation for  admission and further management and would need further testing in the hospital.    Amount and/or Complexity of Data Reviewed  Labs: ordered. Decision-making details documented in ED Course.  Radiology: ordered. Decision-making details documented in ED Course.  ECG/medicine tests: ordered. Decision-making details documented in ED Course.    Risk  OTC drugs.  Prescription drug management.  Decision regarding hospitalization.                                      Clinical Impression:  Final diagnoses:  [R07.9] Chest pain  [I31.9] Pericarditis, unspecified chronicity, unspecified type (Primary)          ED Disposition Condition    Admit Penelope Loredo MD  03/31/24 5702

## 2024-03-31 NOTE — ASSESSMENT & PLAN NOTE
EKG with acute pericarditis  Dexamethasone 8mg IV x1, Aspirin 325 mg given in ER    Start NSAID and Colchicine. Echo ordered. Tele monitoring.  If symptoms worsen consult cardiology  Check CRP

## 2024-04-01 ENCOUNTER — CLINICAL SUPPORT (OUTPATIENT)
Dept: CARDIOLOGY | Facility: HOSPITAL | Age: 49
End: 2024-04-01
Attending: EMERGENCY MEDICINE
Payer: COMMERCIAL

## 2024-04-01 VITALS — BODY MASS INDEX: 29.35 KG/M2 | WEIGHT: 205 LBS | HEIGHT: 70 IN

## 2024-04-01 PROBLEM — E87.1 HYPONATREMIA: Status: ACTIVE | Noted: 2024-04-01

## 2024-04-01 LAB
ALBUMIN SERPL BCP-MCNC: 4.4 G/DL (ref 3.5–5.2)
ALP SERPL-CCNC: 82 U/L (ref 55–135)
ALT SERPL W/O P-5'-P-CCNC: 29 U/L (ref 10–44)
ANION GAP SERPL CALC-SCNC: 4 MMOL/L (ref 8–16)
AORTIC ROOT ANNULUS: 2.9 CM
AORTIC VALVE CUSP SEPERATION: 1.8 CM
ASCENDING AORTA: 2.7 CM
AST SERPL-CCNC: 16 U/L (ref 10–40)
AV INDEX (PROSTH): 0.81
AV MEAN GRADIENT: 3 MMHG
AV PEAK GRADIENT: 7 MMHG
AV VALVE AREA BY VELOCITY RATIO: 2.69 CM²
AV VALVE AREA: 2.81 CM²
AV VELOCITY RATIO: 0.78
BILIRUB SERPL-MCNC: 0.5 MG/DL (ref 0.1–1)
BSA FOR ECHO PROCEDURE: 2.14 M2
BUN SERPL-MCNC: 17 MG/DL (ref 6–20)
CALCIUM SERPL-MCNC: 9.4 MG/DL (ref 8.7–10.5)
CHLORIDE SERPL-SCNC: 102 MMOL/L (ref 95–110)
CHOLEST SERPL-MCNC: 192 MG/DL (ref 120–199)
CHOLEST/HDLC SERPL: 2.7 {RATIO} (ref 2–5)
CO2 SERPL-SCNC: 26 MMOL/L (ref 23–29)
CREAT SERPL-MCNC: 1.1 MG/DL (ref 0.5–1.4)
CREAT UR-MCNC: 55.2 MG/DL (ref 23–375)
CRP SERPL-MCNC: 5.3 MG/DL
CV ECHO LV RWT: 0.68 CM
DOP CALC AO PEAK VEL: 1.3 M/S
DOP CALC AO VTI: 22.7 CM
DOP CALC LVOT AREA: 3.5 CM2
DOP CALC LVOT DIAMETER: 2.1 CM
DOP CALC LVOT PEAK VEL: 1.01 M/S
DOP CALC LVOT STROKE VOLUME: 63.7 CM3
DOP CALC MV VTI: 22.9 CM
DOP CALCLVOT PEAK VEL VTI: 18.4 CM
E WAVE DECELERATION TIME: 275 MSEC
E/A RATIO: 1
E/E' RATIO: 5.04 M/S
ECHO LV POSTERIOR WALL: 1.4 CM (ref 0.6–1.1)
ERYTHROCYTE [DISTWIDTH] IN BLOOD BY AUTOMATED COUNT: 13.2 % (ref 11.5–14.5)
ERYTHROCYTE [SEDIMENTATION RATE] IN BLOOD BY WESTERGREN METHOD: 10 MM/HR (ref 0–10)
EST. GFR  (NO RACE VARIABLE): >60 ML/MIN/1.73 M^2
FRACTIONAL SHORTENING: 34 % (ref 28–44)
GLUCOSE SERPL-MCNC: 163 MG/DL (ref 70–110)
HCT VFR BLD AUTO: 38.2 % (ref 40–54)
HDLC SERPL-MCNC: 71 MG/DL (ref 40–75)
HDLC SERPL: 37 % (ref 20–50)
HGB BLD-MCNC: 12.7 G/DL (ref 14–18)
INTERVENTRICULAR SEPTUM: 0.9 CM (ref 0.6–1.1)
LDLC SERPL CALC-MCNC: 108 MG/DL (ref 63–159)
LEFT ATRIUM SIZE: 2.8 CM
LEFT INTERNAL DIMENSION IN SYSTOLE: 2.7 CM (ref 2.1–4)
LEFT VENTRICLE DIASTOLIC VOLUME INDEX: 35.17 ML/M2
LEFT VENTRICLE DIASTOLIC VOLUME: 74.2 ML
LEFT VENTRICLE MASS INDEX: 76 G/M2
LEFT VENTRICLE SYSTOLIC VOLUME INDEX: 12.8 ML/M2
LEFT VENTRICLE SYSTOLIC VOLUME: 27 ML
LEFT VENTRICULAR INTERNAL DIMENSION IN DIASTOLE: 4.1 CM (ref 3.5–6)
LEFT VENTRICULAR MASS: 161.36 G
LV LATERAL E/E' RATIO: 4.86 M/S
LV SEPTAL E/E' RATIO: 5.23 M/S
LVOT MG: 2 MMHG
LVOT MV: 0.64 CM/S
MCH RBC QN AUTO: 31.2 PG (ref 27–31)
MCHC RBC AUTO-ENTMCNC: 33.2 G/DL (ref 32–36)
MCV RBC AUTO: 94 FL (ref 82–98)
MV MEAN GRADIENT: 2 MMHG
MV PEAK A VEL: 0.68 M/S
MV PEAK E VEL: 0.68 M/S
MV PEAK GRADIENT: 3 MMHG
MV STENOSIS PRESSURE HALF TIME: 60 MS
MV VALVE AREA BY CONTINUITY EQUATION: 2.78 CM2
MV VALVE AREA P 1/2 METHOD: 3.67 CM2
NONHDLC SERPL-MCNC: 121 MG/DL
OSMOLALITY UR: 308 MOSM/KG (ref 50–1200)
PISA TR MAX VEL: 1.89 M/S
PLATELET # BLD AUTO: 192 K/UL (ref 150–450)
PMV BLD AUTO: 9.7 FL (ref 9.2–12.9)
POTASSIUM SERPL-SCNC: 4.4 MMOL/L (ref 3.5–5.1)
PROT SERPL-MCNC: 7.6 G/DL (ref 6–8.4)
PV MV: 0.67 M/S
PV PEAK GRADIENT: 4 MMHG
PV PEAK VELOCITY: 0.96 M/S
RA PRESSURE ESTIMATED: 3 MMHG
RBC # BLD AUTO: 4.07 M/UL (ref 4.6–6.2)
RIGHT VENTRICULAR END-DIASTOLIC DIMENSION: 2.9 CM
RV TB RVSP: 5 MMHG
SODIUM SERPL-SCNC: 132 MMOL/L (ref 136–145)
SODIUM UR-SCNC: 52 MMOL/L (ref 20–250)
TDI LATERAL: 0.14 M/S
TDI SEPTAL: 0.13 M/S
TDI: 0.14 M/S
TR MAX PG: 14 MMHG
TRIGL SERPL-MCNC: 65 MG/DL (ref 30–150)
TROPONIN I SERPL HS-MCNC: 5.3 PG/ML (ref 0–14.9)
TROPONIN I SERPL HS-MCNC: 6.3 PG/ML (ref 0–14.9)
TSH SERPL DL<=0.005 MIU/L-ACNC: 0.39 UIU/ML (ref 0.34–5.6)
TV REST PULMONARY ARTERY PRESSURE: 17 MMHG
WBC # BLD AUTO: 6.95 K/UL (ref 3.9–12.7)
Z-SCORE OF LEFT VENTRICULAR DIMENSION IN END DIASTOLE: -4.78
Z-SCORE OF LEFT VENTRICULAR DIMENSION IN END SYSTOLE: -3.18

## 2024-04-01 PROCEDURE — 85651 RBC SED RATE NONAUTOMATED: CPT | Performed by: INTERNAL MEDICINE

## 2024-04-01 PROCEDURE — 84484 ASSAY OF TROPONIN QUANT: CPT | Performed by: NURSE PRACTITIONER

## 2024-04-01 PROCEDURE — 85027 COMPLETE CBC AUTOMATED: CPT | Performed by: INTERNAL MEDICINE

## 2024-04-01 PROCEDURE — 99900031 HC PATIENT EDUCATION (STAT)

## 2024-04-01 PROCEDURE — 83935 ASSAY OF URINE OSMOLALITY: CPT | Performed by: NURSE PRACTITIONER

## 2024-04-01 PROCEDURE — 96374 THER/PROPH/DIAG INJ IV PUSH: CPT | Mod: 59

## 2024-04-01 PROCEDURE — 63600175 PHARM REV CODE 636 W HCPCS: Performed by: NURSE PRACTITIONER

## 2024-04-01 PROCEDURE — G0378 HOSPITAL OBSERVATION PER HR: HCPCS

## 2024-04-01 PROCEDURE — 36415 COLL VENOUS BLD VENIPUNCTURE: CPT | Performed by: INTERNAL MEDICINE

## 2024-04-01 PROCEDURE — 36415 COLL VENOUS BLD VENIPUNCTURE: CPT | Mod: XB | Performed by: NURSE PRACTITIONER

## 2024-04-01 PROCEDURE — 94761 N-INVAS EAR/PLS OXIMETRY MLT: CPT

## 2024-04-01 PROCEDURE — 93306 TTE W/DOPPLER COMPLETE: CPT | Mod: 26,,, | Performed by: GENERAL PRACTICE

## 2024-04-01 PROCEDURE — 86140 C-REACTIVE PROTEIN: CPT | Performed by: INTERNAL MEDICINE

## 2024-04-01 PROCEDURE — 82570 ASSAY OF URINE CREATININE: CPT | Performed by: NURSE PRACTITIONER

## 2024-04-01 PROCEDURE — 25000003 PHARM REV CODE 250: Performed by: NURSE PRACTITIONER

## 2024-04-01 PROCEDURE — 96361 HYDRATE IV INFUSION ADD-ON: CPT

## 2024-04-01 PROCEDURE — 80061 LIPID PANEL: CPT | Performed by: NURSE PRACTITIONER

## 2024-04-01 PROCEDURE — 25000003 PHARM REV CODE 250: Performed by: STUDENT IN AN ORGANIZED HEALTH CARE EDUCATION/TRAINING PROGRAM

## 2024-04-01 PROCEDURE — 93306 TTE W/DOPPLER COMPLETE: CPT

## 2024-04-01 PROCEDURE — 84300 ASSAY OF URINE SODIUM: CPT | Performed by: NURSE PRACTITIONER

## 2024-04-01 PROCEDURE — 80053 COMPREHEN METABOLIC PANEL: CPT | Performed by: INTERNAL MEDICINE

## 2024-04-01 PROCEDURE — 84443 ASSAY THYROID STIM HORMONE: CPT | Performed by: NURSE PRACTITIONER

## 2024-04-01 RX ORDER — ALPRAZOLAM 0.25 MG/1
0.25 TABLET ORAL ONCE
Status: COMPLETED | OUTPATIENT
Start: 2024-04-01 | End: 2024-04-01

## 2024-04-01 RX ORDER — SODIUM CHLORIDE 9 MG/ML
INJECTION, SOLUTION INTRAVENOUS CONTINUOUS
Status: DISCONTINUED | OUTPATIENT
Start: 2024-04-01 | End: 2024-04-02

## 2024-04-01 RX ADMIN — PANTOPRAZOLE SODIUM 40 MG: 40 TABLET, DELAYED RELEASE ORAL at 05:04

## 2024-04-01 RX ADMIN — IBUPROFEN 600 MG: 400 TABLET ORAL at 09:04

## 2024-04-01 RX ADMIN — COLCHICINE 0.6 MG: 0.6 TABLET, FILM COATED ORAL at 09:04

## 2024-04-01 RX ADMIN — IBUPROFEN 600 MG: 400 TABLET ORAL at 08:04

## 2024-04-01 RX ADMIN — ONDANSETRON 4 MG: 2 INJECTION INTRAMUSCULAR; INTRAVENOUS at 09:04

## 2024-04-01 RX ADMIN — SENNOSIDES AND DOCUSATE SODIUM 1 TABLET: 8.6; 5 TABLET ORAL at 09:04

## 2024-04-01 RX ADMIN — ALPRAZOLAM 0.25 MG: 0.25 TABLET ORAL at 09:04

## 2024-04-01 RX ADMIN — COLCHICINE 0.6 MG: 0.6 TABLET, FILM COATED ORAL at 08:04

## 2024-04-01 RX ADMIN — IBUPROFEN 600 MG: 400 TABLET ORAL at 02:04

## 2024-04-01 RX ADMIN — SODIUM CHLORIDE: 9 INJECTION, SOLUTION INTRAVENOUS at 11:04

## 2024-04-01 NOTE — PLAN OF CARE
[facility]  Initial Discharge Assessment       Primary Care Provider: Kristin Ramos NP    Admission Diagnosis: Pericarditis, unspecified chronicity, unspecified type [I31.9]    Admission Date: 3/31/2024  Expected Discharge Date: 4/2/2024    Met with pt, spouse present, and completed discharge assessment, verified PCP, pharmacy and information on facesheet.  No HH, DME or dialysis.  Spouse will drive pt home.  No needs.  Pt has PCP appt on 4/5/24 at 104am.    Transition of Care Barriers: None    Payor: [employer] / Plan: [facility] CHOICE PLUS / Product Type: Commercial /     Extended Emergency Contact Information  Primary Emergency Contact: Kaitlin Lucero  Mobile Phone: [phone]  Relation: Significant other  Preferred language: English   needed? No    Discharge Plan A: Home  Discharge Plan B: Home      [id number] - [place] [id number] [facility]  [address] [place] [id number]  Phone: [phone] Fax: [phone]      Initial Assessment (most recent)       Adult Discharge Assessment - 04/01/24 1540          Discharge Assessment    Assessment Type Discharge Planning Assessment     Confirmed/corrected address, phone number and insurance Yes     Confirmed Demographics Correct on Facesheet     Source of Information patient     Communicated AMISHA with patient/caregiver No     People in Home child(adiel), dependent;spouse     Do you expect to return to your current living situation? Yes     Prior to hospitilization cognitive status: Alert/Oriented     Current cognitive status: Alert/Oriented     Walking or Climbing Stairs Difficulty no     Dressing/Bathing Difficulty no     Home Accessibility wheelchair accessible     Home Layout Able to live on 1st floor     Equipment Currently Used at Home none     Readmission within 30 days? No     Patient currently being followed by outpatient case management? No     Do you currently have service(s) that  help you manage your care at home? No     Do you take prescription medications? No     Do you have prescription coverage? Yes     Do you have any problems affording any of your prescribed medications? No     Who is going to help you get home at discharge? spouse     How do you get to doctors appointments? car, drives self     Are you on dialysis? No     Do you take coumadin? No     Discharge Plan A Home     Discharge Plan B Home     DME Needed Upon Discharge  none     Discharge Plan discussed with: Patient     Transition of Care Barriers None

## 2024-04-01 NOTE — ASSESSMENT & PLAN NOTE
Patient has hyponatremia which is uncontrolled,We will aim to correct the sodium by 4-6mEq in 24 hours. We will monitor sodium Daily. The hyponatremia is due to currently unclear. We will obtain the following studies:  Spot urine sodium and spot urine creatinine. We will treat the hyponatremia with IV fluids as follows:  Isotonic NS and Fluid restriction of:   1.8 L  per day. The patient's sodium results have been reviewed and are listed below.  Recent Labs   Lab 04/01/24  0447   *   ------------------------------------------------------------------  Obtain spot urine sodium and creatinine  Isotonic saline infusion  1.8 L fluid restriction

## 2024-04-01 NOTE — PLAN OF CARE
Problem: Adult Inpatient Plan of Care  Goal: Plan of Care Review  4/1/2024 1814 by Matty Velasquez RN  Outcome: Ongoing, Progressing  4/1/2024 1812 by Matty Velasquez RN  Outcome: Ongoing, Progressing  Goal: Patient-Specific Goal (Individualized)  4/1/2024 1814 by Matty Velasquez RN  Outcome: Ongoing, Progressing  4/1/2024 1812 by Matty Velasquez RN  Outcome: Ongoing, Progressing  Goal: Absence of Hospital-Acquired Illness or Injury  4/1/2024 1814 by Matty Velasquez RN  Outcome: Ongoing, Progressing  4/1/2024 1812 by Matty Velasquez RN  Outcome: Ongoing, Progressing  Goal: Optimal Comfort and Wellbeing  4/1/2024 1814 by Matty Velasquez RN  Outcome: Ongoing, Progressing  4/1/2024 1812 by Matty Velasquez RN  Outcome: Ongoing, Progressing  Goal: Readiness for Transition of Care  4/1/2024 1814 by Matty Velasquez RN  Outcome: Ongoing, Progressing  4/1/2024 1812 by Matty Velasquez RN  Outcome: Ongoing, Progressing

## 2024-04-01 NOTE — ASSESSMENT & PLAN NOTE
EKG with acute pericarditis  ESR 22  Dexamethasone 8mg IV x1, Aspirin 325 mg given in ER  Continue ibuprofen and Colchicine.   CRP and Echocardiogram pending

## 2024-04-01 NOTE — PROGRESS NOTES
[facility]  Progress Note    Patient Name: Alex Nobles  MRN: [id number]  Patient Class: OP- Observation   Admission Date: 3/31/2024  Length of Stay: 0 days  Attending Physician: Edilson Dixon MD  Primary Care Provider: Kristin Ramos NP        Subjective:     Principal Problem:Chest pain        HPI:  49 year old male with a no significant past medical history reports to the emergency room today for complaints of substernal chest pain, tightness and shortness of breath worsening with exertion, and high blood pressure. Denies fever, chills, nausea, vomiting. Chest trauma. Pain feels better when he sits up and leans forward. In ER, EKG with evidence of pericarditis. Admitted to hospital medicine due to patient clinical presentation concerning for acute coronary syndrome. Aspirin given. Steroid and nitroglycerin given and patient initially hypertensive, treated with beta-blocker.     Overview/Hospital Course:  No notes on file    Interval History:  Sitting up in bed, awake and alert.  No overnight events.  Reports ambulating hallway this morning and had to go back to bed due to developing chest pain.  Chest pain is left-sided and raise left lateral area, no associated symptoms.  It relieved with rest.  Reports having dizziness with the chest pain yesterday, but no dizziness today.  Echocardiogram and a CRP pending.  Noted to have low-sodium and denies drinking too much water.    Review of Systems   Constitutional:  Negative for activity change, diaphoresis and fatigue.   Respiratory:  Negative for cough and shortness of breath.    Cardiovascular:  Positive for chest pain. Negative for palpitations and leg swelling.   Gastrointestinal:  Negative for abdominal pain, constipation and nausea.   Genitourinary:  Negative for dysuria and flank pain.   Musculoskeletal:  Negative for arthralgias and gait problem.   Neurological:  Positive for dizziness. Negative for weakness, numbness  and headaches.     Objective:     Vital Signs (Most Recent):  Temp: 98.7 °F (37.1 °C) (04/01/24 0751)  Pulse: 70 (04/01/24 0751)  Resp: 18 (04/01/24 0751)  BP: 136/88 (04/01/24 0751)  SpO2: 100 % (04/01/24 0751) Vital Signs (24h Range):  Temp:  [97.8 °F (36.6 °C)-98.7 °F (37.1 °C)] 98.7 °F (37.1 °C)  Pulse:  [70-88] 70  Resp:  [18-20] 18  SpO2:  [95 %-100 %] 100 %  BP: (113-189)/() 136/88     Weight: 93 kg (205 lb)  Body mass index is 29.41 kg/m².  No intake or output data in the 24 hours ending 04/01/24 0935      Physical Exam  Constitutional:       General: He is not in acute distress.  HENT:      Head: Normocephalic and atraumatic.      Nose: Nose normal. No congestion.      Mouth/Throat:      Mouth: Mucous membranes are moist.      Pharynx: Oropharynx is clear.   Eyes:      Extraocular Movements: Extraocular movements intact.      Conjunctiva/sclera: Conjunctivae normal.      Pupils: Pupils are equal, round, and reactive to light.   Cardiovascular:      Rate and Rhythm: Normal rate and regular rhythm.      Pulses: Normal pulses.      Heart sounds: Normal heart sounds.      Comments: NSR on telemetry with episode of PAT  Pulmonary:      Effort: Pulmonary effort is normal. No respiratory distress.      Breath sounds: Normal breath sounds.   Abdominal:      General: Bowel sounds are normal. There is no distension.      Palpations: Abdomen is soft.      Tenderness: There is no abdominal tenderness.   Musculoskeletal:         General: No swelling. Normal range of motion.      Cervical back: Normal range of motion and neck supple.   Skin:     General: Skin is warm and dry.      Capillary Refill: Capillary refill takes less than 2 seconds.   Neurological:      General: No focal deficit present.      Mental Status: He is alert and oriented to person, place, and time.      Cranial Nerves: No cranial nerve deficit.   Psychiatric:         Attention and Perception: Attention normal.         Mood and Affect: Mood and  affect normal.             Significant Labs: All pertinent labs within the past 24 hours have been reviewed.  CBC:   Recent Labs   Lab 03/31/24  1619 04/01/24  0447   WBC 5.92 6.95   HGB 13.3* 12.7*   HCT 39.7* 38.2*    192     CMP:   Recent Labs   Lab 03/31/24  1619 04/01/24  0447   * 132*   K 3.8 4.4    102   CO2 24 26   GLU 85 163*   BUN 13 17   CREATININE 1.1 1.1   CALCIUM 9.3 9.4   PROT 8.0 7.6   ALBUMIN 4.7 4.4   BILITOT 0.3 0.5   ALKPHOS 87 82   AST 23 16   ALT 36 29   ANIONGAP 8 4*     Lipid Panel:   Recent Labs   Lab 04/01/24 0447   CHOL 192   HDL 71   LDLCALC 108.0   TRIG 65   CHOLHDL 37.0     Troponin:   Recent Labs   Lab 03/31/24  1619 03/31/24  1842 03/31/24  2357   TROPONINIHS 7.6 9.3 6.3     TSH:   Recent Labs   Lab 04/01/24 0447   TSH 0.390     Lab Results   Component Value Date    SEDRATE 22 (H) 03/31/2024       X-Ray Chest AP Portable  AP chest radiograph: 3/31/2024 4:39 PM CDT    Indication: 49 years  old Male with Chest Pain.    Comparison: None available    Findings: The cardiomediastinal silhouette is normal in size.    No pneumothorax is seen.    No acute airspace opacities are seen.    No discrete pleural effusion is apparent.    Impression: No acute airspace opacities are seen.    Electronically signed by:  Farida Gorman DO  03/31/2024 04:40 PM CDT Workstation: [id number]        Significant Imaging: I have reviewed all pertinent imaging results/findings within the past 24 hours.    Assessment/Plan:      * Chest pain  RORO=2  Troponin remains negative  Lipid panel normal, TSH normal  Echocardiogram pending      Hyponatremia  Patient has hyponatremia which is uncontrolled,We will aim to correct the sodium by 4-6mEq in 24 hours. We will monitor sodium Daily. The hyponatremia is due to currently unclear. We will obtain the following studies:  Spot urine sodium and spot urine creatinine. We will treat the hyponatremia with IV fluids as follows:  Isotonic NS and Fluid  restriction of:   1.8 L  per day. The patient's sodium results have been reviewed and are listed below.  Recent Labs   Lab 04/01/24  0447   *   ------------------------------------------------------------------  Obtain spot urine sodium and creatinine  Isotonic saline infusion  1.8 L fluid restriction    Pericarditis  EKG with acute pericarditis  ESR 22  Dexamethasone 8mg IV x1, Aspirin 325 mg given in ER  Continue ibuprofen and Colchicine.   CRP and Echocardiogram pending      VTE Risk Mitigation (From admission, onward)           Ordered     Place sequential compression device  Until discontinued         03/31/24 1757     IP VTE LOW RISK PATIENT  Once         03/31/24 1757                    Discharge Planning   AMISHA:      Code Status: Full Code   Is the patient medically ready for discharge?:     Reason for patient still in hospital (select all that apply): Patient trending condition and Treatment                     Shani Espinoza NP  Department of Hospital Medicine   [facility]

## 2024-04-01 NOTE — SUBJECTIVE & OBJECTIVE
Interval History:  Sitting up in bed, awake and alert.  No overnight events.  Reports ambulating hallway this morning and had to go back to bed due to developing chest pain.  Chest pain is left-sided and raise left lateral area, no associated symptoms.  It relieved with rest.  Reports having dizziness with the chest pain yesterday, but no dizziness today.  Echocardiogram and a CRP pending.  Noted to have low-sodium and denies drinking too much water.    Review of Systems   Constitutional:  Negative for activity change, diaphoresis and fatigue.   Respiratory:  Negative for cough and shortness of breath.    Cardiovascular:  Positive for chest pain. Negative for palpitations and leg swelling.   Gastrointestinal:  Negative for abdominal pain, constipation and nausea.   Genitourinary:  Negative for dysuria and flank pain.   Musculoskeletal:  Negative for arthralgias and gait problem.   Neurological:  Positive for dizziness. Negative for weakness, numbness and headaches.     Objective:     Vital Signs (Most Recent):  Temp: 98.7 °F (37.1 °C) (04/01/24 0751)  Pulse: 70 (04/01/24 0751)  Resp: 18 (04/01/24 0751)  BP: 136/88 (04/01/24 0751)  SpO2: 100 % (04/01/24 0751) Vital Signs (24h Range):  Temp:  [97.8 °F (36.6 °C)-98.7 °F (37.1 °C)] 98.7 °F (37.1 °C)  Pulse:  [70-88] 70  Resp:  [18-20] 18  SpO2:  [95 %-100 %] 100 %  BP: (113-189)/() 136/88     Weight: 93 kg (205 lb)  Body mass index is 29.41 kg/m².  No intake or output data in the 24 hours ending 04/01/24 0935      Physical Exam  Constitutional:       General: He is not in acute distress.  HENT:      Head: Normocephalic and atraumatic.      Nose: Nose normal. No congestion.      Mouth/Throat:      Mouth: Mucous membranes are moist.      Pharynx: Oropharynx is clear.   Eyes:      Extraocular Movements: Extraocular movements intact.      Conjunctiva/sclera: Conjunctivae normal.      Pupils: Pupils are equal, round, and reactive to light.   Cardiovascular:      Rate  and Rhythm: Normal rate and regular rhythm.      Pulses: Normal pulses.      Heart sounds: Normal heart sounds.      Comments: NSR on telemetry with episode of PAT  Pulmonary:      Effort: Pulmonary effort is normal. No respiratory distress.      Breath sounds: Normal breath sounds.   Abdominal:      General: Bowel sounds are normal. There is no distension.      Palpations: Abdomen is soft.      Tenderness: There is no abdominal tenderness.   Musculoskeletal:         General: No swelling. Normal range of motion.      Cervical back: Normal range of motion and neck supple.   Skin:     General: Skin is warm and dry.      Capillary Refill: Capillary refill takes less than 2 seconds.   Neurological:      General: No focal deficit present.      Mental Status: He is alert and oriented to person, place, and time.      Cranial Nerves: No cranial nerve deficit.   Psychiatric:         Attention and Perception: Attention normal.         Mood and Affect: Mood and affect normal.             Significant Labs: All pertinent labs within the past 24 hours have been reviewed.  CBC:   Recent Labs   Lab 03/31/24  1619 04/01/24  0447   WBC 5.92 6.95   HGB 13.3* 12.7*   HCT 39.7* 38.2*    192     CMP:   Recent Labs   Lab 03/31/24  1619 04/01/24  0447   * 132*   K 3.8 4.4    102   CO2 24 26   GLU 85 163*   BUN 13 17   CREATININE 1.1 1.1   CALCIUM 9.3 9.4   PROT 8.0 7.6   ALBUMIN 4.7 4.4   BILITOT 0.3 0.5   ALKPHOS 87 82   AST 23 16   ALT 36 29   ANIONGAP 8 4*     Lipid Panel:   Recent Labs   Lab 04/01/24  0447   CHOL 192   HDL 71   LDLCALC 108.0   TRIG 65   CHOLHDL 37.0     Troponin:   Recent Labs   Lab 03/31/24  1619 03/31/24  1842 03/31/24  2357   TROPONINIHS 7.6 9.3 6.3     TSH:   Recent Labs   Lab 04/01/24  0447   TSH 0.390     Lab Results   Component Value Date    SEDRATE 22 (H) 03/31/2024       X-Ray Chest AP Portable  AP chest radiograph: 3/31/2024 4:39 PM CDT    Indication: 49 years  old Male with Chest  Pain.    Comparison: None available    Findings: The cardiomediastinal silhouette is normal in size.    No pneumothorax is seen.    No acute airspace opacities are seen.    No discrete pleural effusion is apparent.    Impression: No acute airspace opacities are seen.    Electronically signed by:  Farida Gorman DO  03/31/2024 04:40 PM CDT Workstation: [id number]        Significant Imaging: I have reviewed all pertinent imaging results/findings within the past 24 hours.

## 2024-04-02 VITALS
OXYGEN SATURATION: 99 % | SYSTOLIC BLOOD PRESSURE: 146 MMHG | TEMPERATURE: 97 F | WEIGHT: 205 LBS | RESPIRATION RATE: 18 BRPM | BODY MASS INDEX: 29.35 KG/M2 | HEART RATE: 67 BPM | DIASTOLIC BLOOD PRESSURE: 66 MMHG | HEIGHT: 70 IN

## 2024-04-02 PROBLEM — R07.9 CHEST PAIN: Status: RESOLVED | Noted: 2024-03-31 | Resolved: 2024-04-02

## 2024-04-02 PROBLEM — I10 ESSENTIAL HYPERTENSION: Status: ACTIVE | Noted: 2024-04-02

## 2024-04-02 PROBLEM — E87.1 HYPONATREMIA: Status: RESOLVED | Noted: 2024-04-01 | Resolved: 2024-04-02

## 2024-04-02 LAB
ALBUMIN SERPL BCP-MCNC: 4 G/DL (ref 3.5–5.2)
ALP SERPL-CCNC: 68 U/L (ref 55–135)
ALT SERPL W/O P-5'-P-CCNC: 26 U/L (ref 10–44)
ANION GAP SERPL CALC-SCNC: 6 MMOL/L (ref 8–16)
AST SERPL-CCNC: 17 U/L (ref 10–40)
BASOPHILS # BLD AUTO: 0.01 K/UL (ref 0–0.2)
BASOPHILS NFR BLD: 0.1 % (ref 0–1.9)
BILIRUB SERPL-MCNC: 0.3 MG/DL (ref 0.1–1)
BUN SERPL-MCNC: 15 MG/DL (ref 6–20)
CALCIUM SERPL-MCNC: 8.8 MG/DL (ref 8.7–10.5)
CHLORIDE SERPL-SCNC: 107 MMOL/L (ref 95–110)
CO2 SERPL-SCNC: 23 MMOL/L (ref 23–29)
CREAT SERPL-MCNC: 1.2 MG/DL (ref 0.5–1.4)
DIFFERENTIAL METHOD BLD: ABNORMAL
EOSINOPHIL # BLD AUTO: 0 K/UL (ref 0–0.5)
EOSINOPHIL NFR BLD: 0.2 % (ref 0–8)
ERYTHROCYTE [DISTWIDTH] IN BLOOD BY AUTOMATED COUNT: 13.3 % (ref 11.5–14.5)
EST. GFR  (NO RACE VARIABLE): >60 ML/MIN/1.73 M^2
GLUCOSE SERPL-MCNC: 94 MG/DL (ref 70–110)
HCT VFR BLD AUTO: 39.4 % (ref 40–54)
HGB BLD-MCNC: 13 G/DL (ref 14–18)
IMM GRANULOCYTES # BLD AUTO: 0.02 K/UL (ref 0–0.04)
IMM GRANULOCYTES NFR BLD AUTO: 0.2 % (ref 0–0.5)
LYMPHOCYTES # BLD AUTO: 3 K/UL (ref 1–4.8)
LYMPHOCYTES NFR BLD: 33.6 % (ref 18–48)
MAGNESIUM SERPL-MCNC: 2 MG/DL (ref 1.6–2.6)
MCH RBC QN AUTO: 31.3 PG (ref 27–31)
MCHC RBC AUTO-ENTMCNC: 33 G/DL (ref 32–36)
MCV RBC AUTO: 95 FL (ref 82–98)
MONOCYTES # BLD AUTO: 0.9 K/UL (ref 0.3–1)
MONOCYTES NFR BLD: 10.5 % (ref 4–15)
NEUTROPHILS # BLD AUTO: 4.9 K/UL (ref 1.8–7.7)
NEUTROPHILS NFR BLD: 55.4 % (ref 38–73)
NRBC BLD-RTO: 0 /100 WBC
OHS QRS DURATION: 88 MS
OHS QRS DURATION: 88 MS
OHS QTC CALCULATION: 395 MS
OHS QTC CALCULATION: 395 MS
PHOSPHATE SERPL-MCNC: 3.7 MG/DL (ref 2.7–4.5)
PLATELET # BLD AUTO: 204 K/UL (ref 150–450)
PMV BLD AUTO: 9.9 FL (ref 9.2–12.9)
POTASSIUM SERPL-SCNC: 3.9 MMOL/L (ref 3.5–5.1)
PROT SERPL-MCNC: 7 G/DL (ref 6–8.4)
RBC # BLD AUTO: 4.16 M/UL (ref 4.6–6.2)
SODIUM SERPL-SCNC: 136 MMOL/L (ref 136–145)
WBC # BLD AUTO: 8.83 K/UL (ref 3.9–12.7)

## 2024-04-02 PROCEDURE — 96361 HYDRATE IV INFUSION ADD-ON: CPT

## 2024-04-02 PROCEDURE — 83735 ASSAY OF MAGNESIUM: CPT | Performed by: NURSE PRACTITIONER

## 2024-04-02 PROCEDURE — G0378 HOSPITAL OBSERVATION PER HR: HCPCS

## 2024-04-02 PROCEDURE — 25000003 PHARM REV CODE 250: Performed by: NURSE PRACTITIONER

## 2024-04-02 PROCEDURE — 84100 ASSAY OF PHOSPHORUS: CPT | Performed by: NURSE PRACTITIONER

## 2024-04-02 PROCEDURE — 80053 COMPREHEN METABOLIC PANEL: CPT | Performed by: NURSE PRACTITIONER

## 2024-04-02 PROCEDURE — 85025 COMPLETE CBC W/AUTO DIFF WBC: CPT | Performed by: NURSE PRACTITIONER

## 2024-04-02 PROCEDURE — 36415 COLL VENOUS BLD VENIPUNCTURE: CPT | Mod: XB | Performed by: NURSE PRACTITIONER

## 2024-04-02 RX ORDER — CLONIDINE HYDROCHLORIDE 0.1 MG/1
0.1 TABLET ORAL DAILY PRN
Qty: 14 TABLET | Refills: 0 | Status: SHIPPED | OUTPATIENT
Start: 2024-04-02 | End: 2024-04-05

## 2024-04-02 RX ORDER — IBUPROFEN 600 MG/1
600 TABLET ORAL 3 TIMES DAILY
Qty: 90 TABLET | Refills: 0 | Status: SHIPPED | OUTPATIENT
Start: 2024-04-02 | End: 2024-04-24

## 2024-04-02 RX ORDER — AMLODIPINE BESYLATE 2.5 MG/1
2.5 TABLET ORAL DAILY
Qty: 30 TABLET | Refills: 11 | Status: SHIPPED | OUTPATIENT
Start: 2024-04-02 | End: 2024-04-05

## 2024-04-02 RX ORDER — AMLODIPINE BESYLATE 2.5 MG/1
2.5 TABLET ORAL DAILY
Status: DISCONTINUED | OUTPATIENT
Start: 2024-04-02 | End: 2024-04-02 | Stop reason: HOSPADM

## 2024-04-02 RX ORDER — COLCHICINE 0.6 MG/1
0.6 TABLET ORAL 2 TIMES DAILY
Qty: 60 TABLET | Refills: 0 | Status: SHIPPED | OUTPATIENT
Start: 2024-04-02 | End: 2024-04-24 | Stop reason: SDUPTHER

## 2024-04-02 RX ADMIN — SODIUM CHLORIDE: 9 INJECTION, SOLUTION INTRAVENOUS at 01:04

## 2024-04-02 RX ADMIN — COLCHICINE 0.6 MG: 0.6 TABLET, FILM COATED ORAL at 08:04

## 2024-04-02 RX ADMIN — PANTOPRAZOLE SODIUM 40 MG: 40 TABLET, DELAYED RELEASE ORAL at 06:04

## 2024-04-02 RX ADMIN — IBUPROFEN 600 MG: 400 TABLET ORAL at 08:04

## 2024-04-02 NOTE — HOSPITAL COURSE
Mr. Nobles was admitted for acute pericarditis.  While here, his vital signs were monitored and he was maintained on telemetry.  He was started on pericarditis treatment with ibuprofen and Colchicine.  He had an echocardiogram which was unremarkable.  He was noted to have mild hyponatremia and given some hypotonic NS, and it improved.  He was had no further chest pain since being in the hospital.  This morning he reports feeling fine and denies any acute issues.  His blood pressure was noted to wax and wane and he will be started on low-dose amlodipine daily. He is being discharged to home today in stable condition.  His spouse is concerned about him having breakthrough HTN, and so he will also be prescribed low-dose clonidine to take PRN.  He will be referred to a cardiologist for further evaluation of the pericarditis. He will also follow up with his PCP.  He did not have any adverse events while here.

## 2024-04-02 NOTE — DISCHARGE SUMMARY
[facility] Medicine  Discharge Summary      Patient Name: Alex Nobles  MRN: [id number]  [facility]: [id number]  Patient Class: OP- Observation  Admission Date: 3/31/2024  Hospital Length of Stay: 0 days  Discharge Date and Time:  04/02/2024 10:53 AM  Attending Physician: Edilson Dixon MD  Discharging Provider: Shani Espinoza NP  Primary Care Provider: Kristin Ramos NP    Primary Care Team: Networked reference to record PCT     HPI:   49 year old male with a no significant past medical history reports to the emergency room today for complaints of substernal chest pain, tightness and shortness of breath worsening with exertion, and high blood pressure. Denies fever, chills, nausea, vomiting. Chest trauma. Pain feels better when he sits up and leans forward. In ER, EKG with evidence of pericarditis. Admitted to hospital medicine due to patient clinical presentation concerning for acute coronary syndrome. Aspirin given. Steroid and nitroglycerin given and patient initially hypertensive, treated with beta-blocker.     * No surgery found *      Hospital Course:   Mr. Nobles was admitted for acute pericarditis.  While here, his vital signs were monitored and he was maintained on telemetry.  He was started on pericarditis treatment with ibuprofen and Colchicine.  He had an echocardiogram which was unremarkable.  He was noted to have mild hyponatremia and given some hypotonic NS, and it improved.  He was had no further chest pain since being in the hospital.  This morning he reports feeling fine and denies any acute issues.  His blood pressure was noted to wax and wane and he will be started on low-dose amlodipine daily. He is being discharged to home today in stable condition.  His spouse is concerned about him having breakthrough HTN, and so he will also be prescribed low-dose clonidine to take PRN.  He will be referred to a cardiologist for further evaluation of the pericarditis. He will  also follow up with his PCP.  He did not have any adverse events while here.     Goals of Care Treatment Preferences:  Code Status: Full Code      Consults:     No new Assessment & Plan notes have been filed under this hospital service since the last note was generated.  Service: Hospital Medicine    Final Active Diagnoses:    Diagnosis Date Noted POA    Essential hypertension [I10] 04/02/2024 Yes    Pericarditis [I31.9] 03/31/2024 Yes      Problems Resolved During this Admission:    Diagnosis Date Noted Date Resolved POA    PRINCIPAL PROBLEM:  Chest pain [R07.9] 03/31/2024 04/02/2024 Yes    Hyponatremia [E87.1] 04/01/2024 04/02/2024 Yes       Discharged Condition: stable    Disposition: Home or Self Care    Follow Up:   Follow-up Information       Kristin Ramos NP. Go to.    Specialty: Family Medicine  Why: APPOINTMENT:  April 5, 2024 at 10:40am  Contact information:  [address]  [address]  [facility]  [place] [address]  [phone]                           Patient Instructions:      Ambulatory referral/consult to Cardiology   Standing Status: Future   Referral Priority: Routine Referral Type: Consultation   Referral Reason: Specialty Services Required   Requested Specialty: Cardiology   Number of Visits Requested: 1     Diet Adult Regular     Order Specific Question Answer Comments   Na restriction, if any: 2gNa      Notify your health care provider if you experience any of the following:  severe uncontrolled pain     Notify your health care provider if you experience any of the following:  temperature >100.4     Notify your health care provider if you experience any of the following:  difficulty breathing or increased cough     Notify your health care provider if you experience any of the following:  persistent dizziness, light-headedness, or visual disturbances     Activity as tolerated       Significant Diagnostic Studies: Labs: CMP   Recent Labs   Lab 03/31/24  1619 04/01/24  2219  04/02/24  0449   * 132* 136   K 3.8 4.4 3.9    102 107   CO2 24 26 23   GLU 85 163* 94   BUN 13 17 15   CREATININE 1.1 1.1 1.2   CALCIUM 9.3 9.4 8.8   PROT 8.0 7.6 7.0   ALBUMIN 4.7 4.4 4.0   BILITOT 0.3 0.5 0.3   ALKPHOS 87 82 68   AST 23 16 17   ALT 36 29 26   ANIONGAP 8 4* 6*   , CBC   Recent Labs   Lab 03/31/24  1619 04/01/24  0447 04/02/24  0449   WBC 5.92 6.95 8.83   HGB 13.3* 12.7* 13.0*   HCT 39.7* 38.2* 39.4*    192 204     Lab Results   Component Value Date    CRP 5.30 (H) 04/01/2024     Lab Results   Component Value Date    SEDRATE 10 04/01/2024       and All labs within the past 24 hours have been reviewed    Radiology: Echo    Left Ventricle: There is normal systolic function. There is normal   diastolic function.    Right Ventricle: Normal right ventricular cavity size. Wall thickness   is normal. Right ventricle wall motion  is normal. Systolic function is   normal.    Mitral Valve: There is mild regurgitation.    IVC/SVC: Normal venous pressure at 3 mmHg.    EKG 3/31/24:   Narrative  Performed by: CLAYTON  Test Reason : R07.9,    Vent. Rate : 085 BPM     Atrial Rate : 085 BPM     P-R Int : 172 ms          QRS Dur : 088 ms      QT Int : 332 ms       P-R-T Axes : 064 064 057 degrees     QTc Int : 395 ms    Normal sinus rhythm  Acute pericarditis  Abnormal ECG  When compared with ECG of 31-MAR-2024 15:41,  No significant change was found         Pending Diagnostic Studies:       Procedure Component Value Units Date/Time    C-reactive protein [[id number]] Collected: 03/31/24 1842    Order Status: Sent Lab Status: No result     Specimen: Blood            Medications:  Reconciled Home Medications:      Medication List        START taking these medications      amLODIPine 2.5 MG tablet  Commonly known as: NORVASC  Take 1 tablet (2.5 mg total) by mouth once daily.     cloNIDine 0.1 MG tablet  Commonly known as: CATAPRES  Take 1 tablet (0.1 mg total) by mouth daily as needed. Take as needed  for BP >180/90     colchicine 0.6 mg tablet  Commonly known as: COLCRYS  Take 1 tablet (0.6 mg total) by mouth 2 (two) times a day.     ibuprofen 600 MG tablet  Commonly known as: ADVIL,MOTRIN  Take 1 tablet (600 mg total) by mouth 3 (three) times daily.              Indwelling Lines/Drains at time of discharge:   Lines/Drains/Airways       None                   Time spent on the discharge of patient: 38 minutes         Shani Espinoza NP  Department of Hospital Medicine  [facility]

## 2024-04-02 NOTE — NURSING
Patient and wife received D/C education verbally as well as written. Belongings gathered and sent with patient. IV and tele removed. Patient ambulated with wife to vehicle. D/C home.

## 2024-04-04 NOTE — PLAN OF CARE
04/03/24 2025   Final Note   Assessment Type Final Discharge Note   Anticipated Discharge Disposition Home

## 2024-04-05 ENCOUNTER — OFFICE VISIT (OUTPATIENT)
Dept: FAMILY MEDICINE | Facility: CLINIC | Age: 49
End: 2024-04-05
Payer: COMMERCIAL

## 2024-04-05 VITALS
TEMPERATURE: 99 F | OXYGEN SATURATION: 99 % | WEIGHT: 199 LBS | RESPIRATION RATE: 18 BRPM | HEART RATE: 66 BPM | SYSTOLIC BLOOD PRESSURE: 104 MMHG | BODY MASS INDEX: 28.49 KG/M2 | DIASTOLIC BLOOD PRESSURE: 70 MMHG | HEIGHT: 70 IN

## 2024-04-05 DIAGNOSIS — F41.9 ANXIETY: ICD-10-CM

## 2024-04-05 DIAGNOSIS — D64.9 ANEMIA, UNSPECIFIED TYPE: ICD-10-CM

## 2024-04-05 DIAGNOSIS — Z76.89 ENCOUNTER TO ESTABLISH CARE: Primary | ICD-10-CM

## 2024-04-05 DIAGNOSIS — Z00.00 PREVENTATIVE HEALTH CARE: ICD-10-CM

## 2024-04-05 PROCEDURE — 99203 OFFICE O/P NEW LOW 30 MIN: CPT | Mod: S$GLB,,,

## 2024-04-05 PROCEDURE — 99999 PR PBB SHADOW E&M-EST. PATIENT-LVL IV: CPT | Mod: PBBFAC,,,

## 2024-04-05 PROCEDURE — 1159F MED LIST DOCD IN RCRD: CPT | Mod: CPTII,S$GLB,,

## 2024-04-05 PROCEDURE — 3078F DIAST BP <80 MM HG: CPT | Mod: CPTII,S$GLB,,

## 2024-04-05 PROCEDURE — 3074F SYST BP LT 130 MM HG: CPT | Mod: CPTII,S$GLB,,

## 2024-04-05 RX ORDER — SERTRALINE HYDROCHLORIDE 25 MG/1
25 TABLET, FILM COATED ORAL DAILY
Qty: 30 TABLET | Refills: 1 | Status: SHIPPED | OUTPATIENT
Start: 2024-04-05

## 2024-04-05 NOTE — PROGRESS NOTES
"  SUBJECTIVE:    Patient ID: Alex Nobles is a 49 y.o. male.    Chief Complaint: Establish Care and HFU    HPI    Mr Johnson is a 49yr male, who is here for hospital follow up. He is new to this clinic and provider. Denies any past medical history or taking daily medication prior to hospital.  I have he would all his hospital labs, tests, and progress notes.     "Mr. Nobles was admitted for acute pericarditis.  While here, his vital signs were monitored and he was maintained on telemetry.  He was started on pericarditis treatment with ibuprofen and Colchicine.  He had an echocardiogram which was unremarkable.  He was noted to have mild hyponatremia and given some hypotonic NS, and it improved.  He was had no further chest pain since being in the hospital.  This morning he reports feeling fine and denies any acute issues.  His blood pressure was noted to wax and wane and he will be started on low-dose amlodipine daily. He is being discharged to home today in stable condition.  His spouse is concerned about him having breakthrough HTN, and so he will also be prescribed low-dose clonidine to take PRN.  He will be referred to a cardiologist for further evaluation of the pericarditis. He will also follow up with his PCP.  He did not have any adverse events while here. "      Patient is voicing concerns about not needing blood pressure medications.  He has been checking his blood pressure daily at home and he is systolic only been less than 120s over 70s.  Blood pressure on triage in clinic was 104/70.  Denies dizziness, chest pain, chest tightness, or headache.  He has not been taking the blood pressure medication since his pressures have been lower.  There is some concerns voiced by spouse and himself that he feels that it he has high anxiety.  He feels that some his blood pressure can be related to the increased anxiety especially when he was in the hospital.  Denies suicidal ideation, homicidal ideation, " self-harm.  Is interested in starting a daily medication at this time.  Patient feels that he gets overwhelmed and becomes irritated at home.    He is stopped smoking tobacco over 5 years, consumes alcohol 3 times a week, consumes marijuana daily.  Exercise: Not currently. Diet: No caffeine or soda. Consumes water and beer. Fried foods 2 nights a week, enjoys cabs, chips, rice. Only restricts Diet.     Admit Date: 03/31/2024  Discharge Date: 04/02/2024  Discharge Facility: Hospital      Family and/or Caretaker present at visit? Yes  Medication Reconciliation:  Medications changed/added/deleted.  Amlodipine 2.5 mg daily, clonidine 0.1 mg as needed, colchicine 0.6 mg b.i.d., ibuprofen 600 mg t.i.d,.  New Prescriptions filled after discharge: yes  Discharge summary reviewed:  yes  Diagnostic tests reviewed/disposition: No diagnosic tests pending after this hospitalization  Disease/illness education: HTN and anxiety  Follow up appointments scheduled:  yes              with Cardiology   Follow up labs/tests ordered:   yes  Home Health ordered on discharge: Patient does not have home health established from hospital visit.  They do not need home health.  If needed, we will set up home health for the patient  Home Health company name: n/a    Establishment or re-establishment of referral orders for community resources: No other necessary community resources  DME ordered at discharge:   not applicable  How patient is feeling since discharge from the hospital?  Feels a lot better. Denies CP, Chest tightness, and SOB. Endorse high anxiety- but has had for years.      Discussion with other health care providers: No discussion with other health care providers necessary  Patient follow up phone call documented on separate encounter.            Admission on 03/31/2024, Discharged on 04/02/2024   Component Date Value Ref Range Status    Magnesium 03/31/2024 2.1  1.6 - 2.6 mg/dL Final    QRS Duration 03/31/2024 88  ms Final    [facility]  QTC Calculation 03/31/2024 395  ms Final    WBC 03/31/2024 5.92  3.90 - 12.70 K/uL Final    RBC 03/31/2024 4.24 (L)  4.60 - 6.20 M/uL Final    Hemoglobin 03/31/2024 13.3 (L)  14.0 - 18.0 g/dL Final    Hematocrit 03/31/2024 39.7 (L)  40.0 - 54.0 % Final    MCV 03/31/2024 94  82 - 98 fL Final    MCH 03/31/2024 31.4 (H)  27.0 - 31.0 pg Final    MCHC 03/31/2024 33.5  32.0 - 36.0 g/dL Final    RDW 03/31/2024 13.4  11.5 - 14.5 % Final    Platelets 03/31/2024 194  150 - 450 K/uL Final    MPV 03/31/2024 9.7  9.2 - 12.9 fL Final    Immature Granulocytes 03/31/2024 0.2  0.0 - 0.5 % Final    Gran # (ANC) 03/31/2024 3.7  1.8 - 7.7 K/uL Final    Immature Grans (Abs) 03/31/2024 0.01  0.00 - 0.04 K/uL Final    Lymph # 03/31/2024 1.5  1.0 - 4.8 K/uL Final    Mono # 03/31/2024 0.6  0.3 - 1.0 K/uL Final    Eos # 03/31/2024 0.0  0.0 - 0.5 K/uL Final    Baso # 03/31/2024 0.02  0.00 - 0.20 K/uL Final    nRBC 03/31/2024 0  0 /100 WBC Final    Gran % 03/31/2024 63.2  38.0 - 73.0 % Final    Lymph % 03/31/2024 26.0  18.0 - 48.0 % Final    Mono % 03/31/2024 9.8  4.0 - 15.0 % Final    Eosinophil % 03/31/2024 0.5  0.0 - 8.0 % Final    Basophil % 03/31/2024 0.3  0.0 - 1.9 % Final    Differential Method 03/31/2024 Automated   Final    Sodium 03/31/2024 134 (L)  136 - 145 mmol/L Final    Potassium 03/31/2024 3.8  3.5 - 5.1 mmol/L Final    Chloride 03/31/2024 102  95 - 110 mmol/L Final    CO2 03/31/2024 24  23 - 29 mmol/L Final    Glucose 03/31/2024 85  70 - 110 mg/dL Final    BUN 03/31/2024 13  6 - 20 mg/dL Final    Creatinine 03/31/2024 1.1  0.5 - 1.4 mg/dL Final    Calcium 03/31/2024 9.3  8.7 - 10.5 mg/dL Final    Total Protein 03/31/2024 8.0  6.0 - 8.4 g/dL Final    Albumin 03/31/2024 4.7  3.5 - 5.2 g/dL Final    Total Bilirubin 03/31/2024 0.3  0.1 - 1.0 mg/dL Final    Alkaline Phosphatase 03/31/2024 87  55 - 135 U/L Final    AST 03/31/2024 23  10 - 40 U/L Final    ALT 03/31/2024 36  10 - 44 U/L Final    eGFR 03/31/2024 >60.0  >60 mL/min/1.73  m^2 Final    Anion Gap 03/31/2024 8  8 - 16 mmol/L Final    Troponin I High Sensitivity 03/31/2024 7.6  0.0 - 14.9 pg/mL Final    BNP 03/31/2024 9  0 - 99 pg/mL Final    SARS-CoV-2 RNA, Amplification, Qual 03/31/2024 Negative  Negative Final    Troponin I High Sensitivity 03/31/2024 9.3  0.0 - 14.9 pg/mL Final    Sed Rate 03/31/2024 22 (H)  0 - 10 mm/Hr Final    QRS Duration 03/31/2024 88  ms Final    [facility] QTC Calculation 03/31/2024 395  ms Final    E/A ratio 04/01/2024 1.00   Final    E/E' ratio 04/01/2024 5.04  m/s Final    MV VTI 04/01/2024 22.9  cm Final    Ascending aorta 04/01/2024 2.70  cm Final    Ao root annulus 04/01/2024 2.90  cm Final    Pulmonary Valve Mean Velocity 04/01/2024 0.67  m/s Final    PV peak gradient 04/01/2024 4  mmHg Final    PV PEAK VELOCITY 04/01/2024 0.96  m/s Final    Triscuspid Valve Regurgitation Pea* 04/01/2024 14  mmHg Final    Left Ventricular Outflow Tract Torie* 04/01/2024 2.00  mmHg Final    Left Ventricular Outflow Tract Torie* 04/01/2024 0.64  cm/s Final    RVDD 04/01/2024 2.90  cm Final    MV valve area by continuity eq 04/01/2024 2.78  cm2 Final    MV valve area p 1/2 method 04/01/2024 3.67  cm2 Final    MV stenosis pressure 1/2 time 04/01/2024 60.00  ms Final    MV peak gradient 04/01/2024 3  mmHg Final    MV mean gradient 04/01/2024 2  mmHg Final    KARSON by Velocity Ratio 04/01/2024 2.69  cm² Final    AV index (prosthetic) 04/01/2024 0.81   Final    AV Velocity Ratio 04/01/2024 0.78   Final    AV valve area 04/01/2024 2.81  cm² Final    LVOT peak VTI 04/01/2024 18.40  cm Final    Ao VTI 04/01/2024 22.70  cm Final    Ao peak xiomara 04/01/2024 1.30  m/s Final    AV peak gradient 04/01/2024 7  mmHg Final    AV mean gradient 04/01/2024 3  mmHg Final    LA size 04/01/2024 2.80  cm Final    TR Max Xiomara 04/01/2024 1.89  m/s Final    MV Peak A Xiomara 04/01/2024 0.68  m/s Final    LV Mass Index 04/01/2024 76  g/m2 Final    LV mass 04/01/2024 161.36  g Final    LVOT peak xiomara 04/01/2024 1.01   m/s Final    LV LATERAL E/E' RATIO 04/01/2024 4.86  m/s Final    LV SEPTAL E/E' RATIO 04/01/2024 5.23  m/s Final    E wave deceleration time 04/01/2024 275.00  msec Final    TDI SEPTAL 04/01/2024 0.13  m/s Final    TDI LATERAL 04/01/2024 0.14  m/s Final    MV Peak E Adrian 04/01/2024 0.68  m/s Final    Posterior Wall 04/01/2024 1.40 (A)  0.6 - 1.1 cm Final    Left Ventricle Relative Wall Thick* 04/01/2024 0.68  cm Final    FS 04/01/2024 34  28 - 44 % Final    LVOT area 04/01/2024 3.5  cm2 Final    LVOT diameter 04/01/2024 2.10  cm Final    IVS 04/01/2024 0.90  0.6 - 1.1 cm Final    LV Diastolic Volume Index 04/01/2024 35.17  mL/m2 Final    LV Diastolic Volume 04/01/2024 74.20  mL Final    LVIDs 04/01/2024 2.70  2.1 - 4.0 cm Final    LV Systolic Volume Index 04/01/2024 12.8  mL/m2 Final    LV Systolic Volume 04/01/2024 27.00  mL Final    LVIDd 04/01/2024 4.10  3.5 - 6.0 cm Final    LVOT stroke volume 04/01/2024 63.70  cm3 Final    Mean e' 04/01/2024 0.14  m/s Final    ZLVIDS 04/01/2024 -3.18   Final    ZLVIDD 04/01/2024 -4.78   Final    AORTIC VALVE CUSP SEPERATION 04/01/2024 1.80  cm Final    BSA 04/01/2024 2.14  m2 Final    TV resting pulmonary artery pressu* 04/01/2024 17  mmHg Final    RV TB RVSP 04/01/2024 5  mmHg Final    Est. RA pres 04/01/2024 3  mmHg Final    Troponin I High Sensitivity 03/31/2024 6.3  0.0 - 14.9 pg/mL Final    Cholesterol 04/01/2024 192  120 - 199 mg/dL Final    Triglycerides 04/01/2024 65  30 - 150 mg/dL Final    HDL 04/01/2024 71  40 - 75 mg/dL Final    LDL Cholesterol 04/01/2024 108.0  63.0 - 159.0 mg/dL Final    HDL/Cholesterol Ratio 04/01/2024 37.0  20.0 - 50.0 % Final    Total Cholesterol/HDL Ratio 04/01/2024 2.7  2.0 - 5.0 Final    Non-HDL Cholesterol 04/01/2024 121  mg/dL Final    TSH 04/01/2024 0.390  0.340 - 5.600 uIU/mL Final    WBC 04/01/2024 6.95  3.90 - 12.70 K/uL Final    RBC 04/01/2024 4.07 (L)  4.60 - 6.20 M/uL Final    Hemoglobin 04/01/2024 12.7 (L)  14.0 - 18.0 g/dL Final     Hematocrit 04/01/2024 38.2 (L)  40.0 - 54.0 % Final    MCV 04/01/2024 94  82 - 98 fL Final    MCH 04/01/2024 31.2 (H)  27.0 - 31.0 pg Final    MCHC 04/01/2024 33.2  32.0 - 36.0 g/dL Final    RDW 04/01/2024 13.2  11.5 - 14.5 % Final    Platelets 04/01/2024 192  150 - 450 K/uL Final    MPV 04/01/2024 9.7  9.2 - 12.9 fL Final    Sodium 04/01/2024 132 (L)  136 - 145 mmol/L Final    Potassium 04/01/2024 4.4  3.5 - 5.1 mmol/L Final    Chloride 04/01/2024 102  95 - 110 mmol/L Final    CO2 04/01/2024 26  23 - 29 mmol/L Final    Glucose 04/01/2024 163 (H)  70 - 110 mg/dL Final    BUN 04/01/2024 17  6 - 20 mg/dL Final    Creatinine 04/01/2024 1.1  0.5 - 1.4 mg/dL Final    Calcium 04/01/2024 9.4  8.7 - 10.5 mg/dL Final    Total Protein 04/01/2024 7.6  6.0 - 8.4 g/dL Final    Albumin 04/01/2024 4.4  3.5 - 5.2 g/dL Final    Total Bilirubin 04/01/2024 0.5  0.1 - 1.0 mg/dL Final    Alkaline Phosphatase 04/01/2024 82  55 - 135 U/L Final    AST 04/01/2024 16  10 - 40 U/L Final    ALT 04/01/2024 29  10 - 44 U/L Final    eGFR 04/01/2024 >60.0  >60 mL/min/1.73 m^2 Final    Anion Gap 04/01/2024 4 (L)  8 - 16 mmol/L Final    Troponin I High Sensitivity 04/01/2024 5.3  0.0 - 14.9 pg/mL Final    Sed Rate 04/01/2024 10  0 - 10 mm/Hr Final    Creatinine, Urine 04/01/2024 55.2  23.0 - 375.0 mg/dL Final    Sodium, Urine 04/01/2024 52  20 - 250 mmol/L Final    Osmolality, Urine 04/01/2024 308  50 - 1200 mOsm/kg Final    CRP 04/01/2024 5.30 (H)  <1.00 mg/dL Final    Sodium 04/02/2024 136  136 - 145 mmol/L Final    Potassium 04/02/2024 3.9  3.5 - 5.1 mmol/L Final    Chloride 04/02/2024 107  95 - 110 mmol/L Final    CO2 04/02/2024 23  23 - 29 mmol/L Final    Glucose 04/02/2024 94  70 - 110 mg/dL Final    BUN 04/02/2024 15  6 - 20 mg/dL Final    Creatinine 04/02/2024 1.2  0.5 - 1.4 mg/dL Final    Calcium 04/02/2024 8.8  8.7 - 10.5 mg/dL Final    Total Protein 04/02/2024 7.0  6.0 - 8.4 g/dL Final    Albumin 04/02/2024 4.0  3.5 - 5.2 g/dL Final     Total Bilirubin 04/02/2024 0.3  0.1 - 1.0 mg/dL Final    Alkaline Phosphatase 04/02/2024 68  55 - 135 U/L Final    AST 04/02/2024 17  10 - 40 U/L Final    ALT 04/02/2024 26  10 - 44 U/L Final    eGFR 04/02/2024 >60.0  >60 mL/min/1.73 m^2 Final    Anion Gap 04/02/2024 6 (L)  8 - 16 mmol/L Final    Magnesium 04/02/2024 2.0  1.6 - 2.6 mg/dL Final    Phosphorus 04/02/2024 3.7  2.7 - 4.5 mg/dL Final    WBC 04/02/2024 8.83  3.90 - 12.70 K/uL Final    RBC 04/02/2024 4.16 (L)  4.60 - 6.20 M/uL Final    Hemoglobin 04/02/2024 13.0 (L)  14.0 - 18.0 g/dL Final    Hematocrit 04/02/2024 39.4 (L)  40.0 - 54.0 % Final    MCV 04/02/2024 95  82 - 98 fL Final    MCH 04/02/2024 31.3 (H)  27.0 - 31.0 pg Final    MCHC 04/02/2024 33.0  32.0 - 36.0 g/dL Final    RDW 04/02/2024 13.3  11.5 - 14.5 % Final    Platelets 04/02/2024 204  150 - 450 K/uL Final    MPV 04/02/2024 9.9  9.2 - 12.9 fL Final    Immature Granulocytes 04/02/2024 0.2  0.0 - 0.5 % Final    Gran # (ANC) 04/02/2024 4.9  1.8 - 7.7 K/uL Final    Immature Grans (Abs) 04/02/2024 0.02  0.00 - 0.04 K/uL Final    Lymph # 04/02/2024 3.0  1.0 - 4.8 K/uL Final    Mono # 04/02/2024 0.9  0.3 - 1.0 K/uL Final    Eos # 04/02/2024 0.0  0.0 - 0.5 K/uL Final    Baso # 04/02/2024 0.01  0.00 - 0.20 K/uL Final    nRBC 04/02/2024 0  0 /100 WBC Final    Gran % 04/02/2024 55.4  38.0 - 73.0 % Final    Lymph % 04/02/2024 33.6  18.0 - 48.0 % Final    Mono % 04/02/2024 10.5  4.0 - 15.0 % Final    Eosinophil % 04/02/2024 0.2  0.0 - 8.0 % Final    Basophil % 04/02/2024 0.1  0.0 - 1.9 % Final    Differential Method 04/02/2024 Automated   Final   Admission on 02/24/2024, Discharged on 02/24/2024   Component Date Value Ref Range Status    Aerobic Bacterial Culture 02/24/2024 No growth   Final       Past Medical History:   Diagnosis Date    Hypertension     Pericarditis      History reviewed. No pertinent surgical history.  History reviewed. No pertinent family history.    Marital Status: Single  Alcohol  "History:  reports current alcohol use.  Tobacco History:  reports that he has never smoked. He has never used smokeless tobacco.  Drug History:  reports current drug use. Drug: Marijuana.    Review of patient's allergies indicates:  No Known Allergies    Current Outpatient Medications:     colchicine (COLCRYS) 0.6 mg tablet, Take 1 tablet (0.6 mg total) by mouth 2 (two) times a day., Disp: 60 tablet, Rfl: 0    ibuprofen (ADVIL,MOTRIN) 600 MG tablet, Take 1 tablet (600 mg total) by mouth 3 (three) times daily., Disp: 90 tablet, Rfl: 0    sertraline (ZOLOFT) 25 MG tablet, Take 1 tablet (25 mg total) by mouth once daily., Disp: 30 tablet, Rfl: 1    Review of Systems   Constitutional:  Negative for appetite change, chills, fatigue, fever and unexpected weight change.   HENT:  Negative for congestion, ear pain and sore throat.    Eyes:  Negative for photophobia, pain and visual disturbance.   Respiratory:  Negative for cough, shortness of breath and wheezing.    Cardiovascular:  Negative for chest pain, palpitations and leg swelling.   Gastrointestinal:  Negative for abdominal pain, constipation, diarrhea, nausea and vomiting.   Endocrine: Negative for cold intolerance, heat intolerance and polydipsia.   Genitourinary:  Negative for difficulty urinating, dysuria and hematuria.   Musculoskeletal:  Negative for arthralgias and myalgias.   Skin:  Negative for rash.   Neurological:  Negative for dizziness, syncope, weakness and headaches.   Hematological:  Negative for adenopathy. Does not bruise/bleed easily.   Psychiatric/Behavioral:  Positive for decreased concentration. Negative for dysphoric mood, self-injury, sleep disturbance and suicidal ideas. The patient is nervous/anxious.         Objective:      Vitals:    04/05/24 1019   BP: 104/70   Pulse: 66   Resp: 18   Temp: 98.7 °F (37.1 °C)   TempSrc: Oral   SpO2: 99%   Weight: 90.3 kg (199 lb)   Height: 5' 10" (1.778 m)     Physical Exam  Vitals and nursing note reviewed. "   Constitutional:       General: He is not in acute distress.     Appearance: Normal appearance. He is well-developed. He is not ill-appearing.   HENT:      Head: Normocephalic and atraumatic.      Right Ear: Tympanic membrane, ear canal and external ear normal.      Left Ear: Tympanic membrane, ear canal and external ear normal.      Nose: Nose normal.      Mouth/Throat:      Mouth: Mucous membranes are moist.      Pharynx: Oropharynx is clear. No oropharyngeal exudate.   Eyes:      General: No scleral icterus.     Extraocular Movements: Extraocular movements intact.      Conjunctiva/sclera: Conjunctivae normal.      Pupils: Pupils are equal, round, and reactive to light.   Neck:      Thyroid: No thyroid mass or thyromegaly.      Trachea: Trachea normal.   Cardiovascular:      Rate and Rhythm: Normal rate and regular rhythm.      Pulses: Normal pulses.      Heart sounds: Normal heart sounds. No murmur heard.  Pulmonary:      Effort: Pulmonary effort is normal. No respiratory distress.      Breath sounds: Normal breath sounds. No wheezing or rales.   Abdominal:      General: Bowel sounds are normal. There is no distension.      Palpations: Abdomen is soft. There is no mass.      Tenderness: There is no abdominal tenderness.   Musculoskeletal:         General: Normal range of motion.      Cervical back: Normal range of motion and neck supple.   Lymphadenopathy:      Cervical: No cervical adenopathy.   Skin:     General: Skin is warm and dry.      Coloration: Skin is not pale.      Findings: No rash.   Neurological:      Mental Status: He is alert and oriented to person, place, and time.   Psychiatric:         Mood and Affect: Mood normal.         Behavior: Behavior normal.         Assessment:       1. Encounter to establish care    2. Anxiety    3. Preventative health care    4. Anemia, unspecified type         Plan:       Encounter to establish care    Anxiety  -     sertraline (ZOLOFT) 25 MG tablet; Take 1 tablet  (25 mg total) by mouth once daily.  Dispense: 30 tablet; Refill: 1    Preventative health care  -     HEPATITIS C ANTIBODY; Future; Expected date: 04/05/2024  -     HIV 1/2 Ag/Ab (4th Gen); Future; Expected date: 04/05/2024  -     HEMOGLOBIN A1C; Future; Expected date: 04/05/2024    Anemia, unspecified type  -     CBC Auto Differential; Future; Expected date: 04/05/2024      No follow-ups on file.    -patient is new to establishing care-health maintenance reviewed with patient  -complete labs prior to next visit, recheck in to see if anemia has improved since hospitalization  -start Zoloft 25 mg as prescribed.  Decrease alcohol consumption and marijuana.  -increase physical exercise.  -due to blood pressure being controlled today off medications.  I have stopped all blood pressure medications that were initiated from the hospital.  However continue taking the colchicine and ibuprofen as prescribed for the treatment of pericarditis.  -continue chicken blood pressure at home if pressure goes above 130 and continues to stay elevated we will discuss re-initiation of 1 of the blood pressure medications.

## 2024-04-24 ENCOUNTER — OFFICE VISIT (OUTPATIENT)
Dept: CARDIOLOGY | Facility: CLINIC | Age: 49
End: 2024-04-24
Payer: COMMERCIAL

## 2024-04-24 VITALS
WEIGHT: 200.81 LBS | SYSTOLIC BLOOD PRESSURE: 124 MMHG | HEART RATE: 68 BPM | DIASTOLIC BLOOD PRESSURE: 75 MMHG | BODY MASS INDEX: 28.75 KG/M2 | HEIGHT: 70 IN | OXYGEN SATURATION: 96 %

## 2024-04-24 DIAGNOSIS — I31.9 PERICARDITIS, UNSPECIFIED CHRONICITY, UNSPECIFIED TYPE: Primary | ICD-10-CM

## 2024-04-24 PROBLEM — I10 ESSENTIAL HYPERTENSION: Status: RESOLVED | Noted: 2024-04-02 | Resolved: 2024-04-24

## 2024-04-24 PROCEDURE — 3078F DIAST BP <80 MM HG: CPT | Mod: CPTII,S$GLB,, | Performed by: INTERNAL MEDICINE

## 2024-04-24 PROCEDURE — 1159F MED LIST DOCD IN RCRD: CPT | Mod: CPTII,S$GLB,, | Performed by: INTERNAL MEDICINE

## 2024-04-24 PROCEDURE — 99999 PR PBB SHADOW E&M-EST. PATIENT-LVL III: CPT | Mod: PBBFAC,,, | Performed by: INTERNAL MEDICINE

## 2024-04-24 PROCEDURE — 99204 OFFICE O/P NEW MOD 45 MIN: CPT | Mod: S$GLB,,, | Performed by: INTERNAL MEDICINE

## 2024-04-24 PROCEDURE — 3008F BODY MASS INDEX DOCD: CPT | Mod: CPTII,S$GLB,, | Performed by: INTERNAL MEDICINE

## 2024-04-24 PROCEDURE — 93000 ELECTROCARDIOGRAM COMPLETE: CPT | Mod: S$GLB,,, | Performed by: GENERAL PRACTICE

## 2024-04-24 PROCEDURE — 3074F SYST BP LT 130 MM HG: CPT | Mod: CPTII,S$GLB,, | Performed by: INTERNAL MEDICINE

## 2024-04-24 RX ORDER — COLCHICINE 0.6 MG/1
0.6 TABLET ORAL DAILY
Qty: 60 TABLET | Refills: 0 | Status: SHIPPED | OUTPATIENT
Start: 2024-04-24 | End: 2024-06-23

## 2024-04-24 RX ORDER — IBUPROFEN 600 MG/1
TABLET ORAL
Start: 2024-04-24

## 2024-04-24 NOTE — PROGRESS NOTES
[facility] [place]  [place]    Subjective:     Patient ID:  Alex Nobles is a 49 y.o. male patient here for evaluation Hospital Follow Up (Patient was at [facility] , on 03/31.2024 for pericarditis. )      HPI:  49-year-old male here for follow-up of pericarditis.  Diagnosed in March.  Patient reports pain in chest which was much worse on deep inspiration and relieved by bending forward, EKG showed diffuse ST elevation, troponin was negative and he had sudden CRP mildly elevated.  Patient is started on colchicine and ibuprofen.  Reports significant improvement since discharge.  Does not report any significant chest pain at this point even on deep inspiration.  Does report some diarrhea.    Review of Systems   All other systems reviewed and are negative.       Past Medical History:   Diagnosis Date    Hypertension     Pericarditis        No past surgical history on file.    No family history on file.    Social History     Socioeconomic History    Marital status: Single   Tobacco Use    Smoking status: Never    Smokeless tobacco: Never   Substance and Sexual Activity    Alcohol use: Yes     Comment: occasionally    Drug use: Yes     Types: Marijuana    Sexual activity: Yes     Partners: Female       Current Outpatient Medications   Medication Sig Dispense Refill    colchicine (COLCRYS) 0.6 mg tablet Take 1 tablet (0.6 mg total) by mouth once daily. 60 tablet 0    ibuprofen (ADVIL,MOTRIN) 600 MG tablet bid for 1 week, then qd for 1 week, then 1/2 tab qd 1 week, then stop      sertraline (ZOLOFT) 25 MG tablet Take 1 tablet (25 mg total) by mouth once daily. (Patient not taking: Reported on 4/24/2024) 30 tablet 1     No current facility-administered medications for this visit.       Review of patient's allergies indicates:  No Known Allergies      Objective:        Vitals:    04/24/24 0914   BP: 124/75   Pulse: 68       Physical Exam  Vitals reviewed.   HENT:      Mouth/Throat:       Mouth: Mucous membranes are moist.   Eyes:      Extraocular Movements: Extraocular movements intact.      Pupils: Pupils are equal, round, and reactive to light.   Cardiovascular:      Rate and Rhythm: Normal rate.      Pulses: Normal pulses.      Heart sounds: Normal heart sounds. No murmur heard.     No friction rub. No gallop.   Pulmonary:      Effort: Pulmonary effort is normal.      Breath sounds: Normal breath sounds.   Abdominal:      General: Bowel sounds are normal.      Palpations: Abdomen is soft.   Musculoskeletal:         General: Normal range of motion.      Cervical back: Normal range of motion.   Skin:     General: Skin is warm and dry.   Neurological:      General: No focal deficit present.      Mental Status: He is alert and oriented to person, place, and time.   Psychiatric:         Mood and Affect: Mood normal.         LIPIDS - LAST 2   Lab Results   Component Value Date    CHOL 192 04/01/2024    HDL 71 04/01/2024    LDLCALC 108.0 04/01/2024    TRIG 65 04/01/2024    CHOLHDL 37.0 04/01/2024       CBC - LAST 2  Lab Results   Component Value Date    WBC 8.83 04/02/2024    WBC 6.95 04/01/2024    RBC 4.16 (L) 04/02/2024    RBC 4.07 (L) 04/01/2024    HGB 13.0 (L) 04/02/2024    HGB 12.7 (L) 04/01/2024    HCT 39.4 (L) 04/02/2024    HCT 38.2 (L) 04/01/2024    MCV 95 04/02/2024    MCV 94 04/01/2024    MCH 31.3 (H) 04/02/2024    MCH 31.2 (H) 04/01/2024    MCHC 33.0 04/02/2024    MCHC 33.2 04/01/2024    RDW 13.3 04/02/2024    RDW 13.2 04/01/2024     04/02/2024     04/01/2024    MPV 9.9 04/02/2024    MPV 9.7 04/01/2024    GRAN 4.9 04/02/2024    GRAN 55.4 04/02/2024    LYMPH 3.0 04/02/2024    LYMPH 33.6 04/02/2024    MONO 0.9 04/02/2024    MONO 10.5 04/02/2024    BASO 0.01 04/02/2024    BASO 0.02 03/31/2024    NRBC 0 04/02/2024    NRBC 0 03/31/2024       CHEMISTRY & LIVER FUNCTION - LAST 2  Lab Results   Component Value Date     04/02/2024     (L) 04/01/2024    K 3.9 04/02/2024  "   K 4.4 04/01/2024     04/02/2024     04/01/2024    CO2 23 04/02/2024    CO2 26 04/01/2024    ANIONGAP 6 (L) 04/02/2024    ANIONGAP 4 (L) 04/01/2024    BUN 15 04/02/2024    BUN 17 04/01/2024    CREATININE 1.2 04/02/2024    CREATININE 1.1 04/01/2024    GLU 94 04/02/2024     (H) 04/01/2024    CALCIUM 8.8 04/02/2024    CALCIUM 9.4 04/01/2024    MG 2.0 04/02/2024    MG 2.1 03/31/2024    ALBUMIN 4.0 04/02/2024    ALBUMIN 4.4 04/01/2024    PROT 7.0 04/02/2024    PROT 7.6 04/01/2024    ALKPHOS 68 04/02/2024    ALKPHOS 82 04/01/2024    ALT 26 04/02/2024    ALT 29 04/01/2024    AST 17 04/02/2024    AST 16 04/01/2024    BILITOT 0.3 04/02/2024    BILITOT 0.5 04/01/2024        CARDIAC PROFILE - LAST 2  Lab Results   Component Value Date    BNP 9 03/31/2024        COAGULATION - LAST 2  No results found for: "LABPT", "INR", "APTT"    ENDOCRINE & PSA - LAST 2  Lab Results   Component Value Date    TSH 0.390 04/01/2024        ECHOCARDIOGRAM RESULTS  Results for orders placed during the hospital encounter of 03/31/24    Echo    Interpretation Summary    Left Ventricle: There is normal systolic function. There is normal diastolic function.    Right Ventricle: Normal right ventricular cavity size. Wall thickness is normal. Right ventricle wall motion  is normal. Systolic function is normal.    Mitral Valve: There is mild regurgitation.    IVC/SVC: Normal venous pressure at 3 mmHg.      CURRENT/PREVIOUS VISIT EKG  Results for orders placed or performed during the hospital encounter of 03/31/24   EKG 12-lead    Collection Time: 03/31/24  5:00 PM   Result Value Ref Range    QRS Duration 88 ms    [facility] QTC Calculation 395 ms    Narrative    Test Reason : R07.9,    Vent. Rate : 085 BPM     Atrial Rate : 085 BPM     P-R Int : 172 ms          QRS Dur : 088 ms      QT Int : 332 ms       P-R-T Axes : 064 064 057 degrees     QTc Int : 395 ms    Normal sinus rhythm  Acute pericarditis  vs early repolarization  Abnormal " ECG  When compared with ECG of 31-MAR-2024 15:41,  No significant change was found  Confirmed by Maile HERNANDEZ, Shawn BLANDON ([id number]) on 4/2/2024 10:45:37 PM    Referred By: [email or web address]   SELF           Confirmed By:Shawn Gr MD     No valid procedures specified.   No results found for this or any previous visit.    No valid procedures specified.        Assessment:       1. Pericarditis, unspecified chronicity, unspecified type           Plan:       Pericarditis, unspecified chronicity, unspecified type  -     IN OFFICE EKG 12-LEAD (to [facility])  -     colchicine (COLCRYS) 0.6 mg tablet; Take 1 tablet (0.6 mg total) by mouth once daily.  Dispense: 60 tablet; Refill: 0  -     Sedimentation rate; Future; Expected date: 04/24/2024  -     C-REACTIVE PROTEIN; Future; Expected date: 04/24/2024    Other orders  -     ibuprofen (ADVIL,MOTRIN) 600 MG tablet; bid for 1 week, then qd for 1 week, then 1/2 tab qd 1 week, then stop    Pericardial inflammation seems to have improved significantly.  No symptoms.  EKGs shows mild ST elevation but that could be early repolarization at this point.  Patient reports diarrhea with colchicine, will decrease the dose to once a day.  Will continue with ibuprofen at current home dose of 600 mg twice a day for now.  Decrease the dose next week if no recurrent symptoms to 600 mg once a day followed by 300 mg once a day and then stopping it in about 2 weeks' time.  Continue colchicine for a total of about 3 months.  ESR CRP prior to next appointment.  No heavy exertional activities until next appointment.    Follow up in about 2 months (around 6/24/2024) for f/u pericarditis.          MD [facility] [place] [facility] [place]

## 2024-04-29 ENCOUNTER — TELEPHONE (OUTPATIENT)
Dept: FAMILY MEDICINE | Facility: CLINIC | Age: 49
End: 2024-04-29
Payer: COMMERCIAL

## 2024-04-30 ENCOUNTER — TELEPHONE (OUTPATIENT)
Dept: FAMILY MEDICINE | Facility: CLINIC | Age: 49
End: 2024-04-30
Payer: COMMERCIAL

## 2024-05-02 LAB
OHS QRS DURATION: 88 MS
OHS QTC CALCULATION: 378 MS

## 2025-07-29 ENCOUNTER — TELEPHONE (OUTPATIENT)
Dept: FAMILY MEDICINE | Facility: CLINIC | Age: 50
End: 2025-07-29
Payer: COMMERCIAL

## 2025-08-20 ENCOUNTER — PATIENT MESSAGE (OUTPATIENT)
Dept: ADMINISTRATIVE | Facility: HOSPITAL | Age: 50
End: 2025-08-20
Payer: COMMERCIAL